# Patient Record
Sex: MALE | Race: WHITE | NOT HISPANIC OR LATINO | Employment: OTHER | URBAN - METROPOLITAN AREA
[De-identification: names, ages, dates, MRNs, and addresses within clinical notes are randomized per-mention and may not be internally consistent; named-entity substitution may affect disease eponyms.]

---

## 2017-04-02 ENCOUNTER — OFFICE VISIT (OUTPATIENT)
Dept: URGENT CARE | Facility: URGENT CARE | Age: 22
End: 2017-04-02
Payer: COMMERCIAL

## 2017-04-02 VITALS
TEMPERATURE: 98 F | SYSTOLIC BLOOD PRESSURE: 100 MMHG | OXYGEN SATURATION: 98 % | DIASTOLIC BLOOD PRESSURE: 60 MMHG | WEIGHT: 178.5 LBS | HEART RATE: 74 BPM | BODY MASS INDEX: 27.34 KG/M2

## 2017-04-02 DIAGNOSIS — Z11.3 SCREEN FOR STD (SEXUALLY TRANSMITTED DISEASE): ICD-10-CM

## 2017-04-02 DIAGNOSIS — R30.0 DYSURIA: Primary | ICD-10-CM

## 2017-04-02 DIAGNOSIS — N34.2 URETHRITIS: ICD-10-CM

## 2017-04-02 LAB
ALBUMIN UR-MCNC: NEGATIVE MG/DL
APPEARANCE UR: CLEAR
BACTERIA #/AREA URNS HPF: ABNORMAL /HPF
BILIRUB UR QL STRIP: NEGATIVE
COLOR UR AUTO: YELLOW
GLUCOSE UR STRIP-MCNC: NEGATIVE MG/DL
HGB UR QL STRIP: NEGATIVE
KETONES UR STRIP-MCNC: NEGATIVE MG/DL
LEUKOCYTE ESTERASE UR QL STRIP: NEGATIVE
NITRATE UR QL: NEGATIVE
PH UR STRIP: 6.5 PH (ref 5–7)
RBC #/AREA URNS AUTO: ABNORMAL /HPF (ref 0–2)
SP GR UR STRIP: 1.01 (ref 1–1.03)
URN SPEC COLLECT METH UR: ABNORMAL
UROBILINOGEN UR STRIP-ACNC: 0.2 EU/DL (ref 0.2–1)
WBC #/AREA URNS AUTO: ABNORMAL /HPF (ref 0–2)

## 2017-04-02 PROCEDURE — 99214 OFFICE O/P EST MOD 30 MIN: CPT | Performed by: PHYSICIAN ASSISTANT

## 2017-04-02 PROCEDURE — 87591 N.GONORRHOEAE DNA AMP PROB: CPT | Performed by: PHYSICIAN ASSISTANT

## 2017-04-02 PROCEDURE — 87491 CHLMYD TRACH DNA AMP PROBE: CPT | Performed by: PHYSICIAN ASSISTANT

## 2017-04-02 PROCEDURE — 87086 URINE CULTURE/COLONY COUNT: CPT | Performed by: PHYSICIAN ASSISTANT

## 2017-04-02 PROCEDURE — 81001 URINALYSIS AUTO W/SCOPE: CPT | Performed by: PHYSICIAN ASSISTANT

## 2017-04-02 RX ORDER — AZITHROMYCIN 500 MG/1
1000 TABLET, FILM COATED ORAL ONCE
Qty: 2 TABLET | Refills: 0 | Status: SHIPPED | OUTPATIENT
Start: 2017-04-02 | End: 2017-04-02

## 2017-04-02 NOTE — NURSING NOTE
"Chief Complaint   Patient presents with     Urgent Care     Pt c/o constant burning at the end of his penis X 4 days.       Initial /60  Pulse 74  Temp 98  F (36.7  C)  Wt 178 lb 8 oz (81 kg)  SpO2 98%  BMI 27.34 kg/m2 Estimated body mass index is 27.34 kg/(m^2) as calculated from the following:    Height as of 2/29/16: 5' 7.75\" (1.721 m).    Weight as of this encounter: 178 lb 8 oz (81 kg).  Medication Reconciliation: complete    "

## 2017-04-02 NOTE — PROGRESS NOTES
SUBJECTIVE:   Tyrone Whitfield is a 21 year old male who  presents today for burning with urination, but no urinary frequency.  No fevers.    Denies any penile drainage.  Denies any penile sores.    Denies any abdominal pain.   He is otherwise in his usual state of health.     Past Medical History:   Diagnosis Date     Attention deficit disorder with hyperactivity(314.01)      Tinea corporis 8/19/2012     Tourette's disorder      Patient Active Problem List   Diagnosis     Tourette's disorder     Attention deficit hyperactivity disorder (ADHD)     Upper back pain     Environmental allergies     Eczema     Feeling tired     Depression     Cervicalgia     Bilateral low back pain without sciatica     Social History   Substance Use Topics     Smoking status: Never Smoker     Smokeless tobacco: Never Used     Alcohol use No       ROS:   CONSTITUTIONAL:NEGATIVE for fever, chills, change in weight  INTEGUMENTARY/SKIN: NEGATIVE for worrisome rashes, moles or lesions  ENT/MOUTH: NEGATIVE for ear, mouth and throat problems  RESP:NEGATIVE for significant cough or SOB  CV: NEGATIVE for chest pain, palpitations or peripheral edema  GI: NEGATIVE for nausea, abdominal pain, heartburn, or change in bowel habits  : as per HPI    OBJECTIVE:  /60  Pulse 74  Temp 98  F (36.7  C)  Wt 178 lb 8 oz (81 kg)  SpO2 98%  BMI 27.34 kg/m2  GENERAL APPEARANCE: healthy, alert and no distress  ABDOMEN:  soft, nontender, no HSM or masses and bowel sounds normal  BACK: No CVA tenderness  GU_male: testicles normal without  masses or  tenderness, no hernias and penis normal without urethral discharge  SKIN: no suspicious lesions or rashes    (R30.0) Dysuria  (primary encounter diagnosis)  Comment:   Plan: Urine Culture Aerobic Bacterial            (Z11.3) Screen for STD (sexually transmitted disease)  Comment:   Plan: UA with Microscopic reflex to Culture,         Chlamydia trachomatis PCR, Neisseria         gonorrhoeae PCR             (N34.2) Urethritis  Comment:   Plan: azithromycin (ZITHROMAX) 500 MG tablet            F/U with PCP should symptoms persist or worsen.    Patient expresses understanding and agreement with the assessment and plan as above.

## 2017-04-02 NOTE — MR AVS SNAPSHOT
"              After Visit Summary   2017    Tyrone Whitfield    MRN: [id number]           Patient Information     Date Of Birth          1995        Visit Information        Provider Department      2017 4:45 PM Ariane Casper PA-C [facility]        Today's Diagnoses     Dysuria    -  1    Screen for STD (sexually transmitted disease)        Urethritis           Follow-ups after your visit        Who to contact     If you have questions or need follow up information about today's clinic visit or your schedule please contact [facility] directly at [phone].  Normal or non-critical lab and imaging results will be communicated to you by Recurvehart, letter or phone within 4 business days after the clinic has received the results. If you do not hear from us within 7 days, please contact the clinic through Recurvehart or phone. If you have a critical or abnormal lab result, we will notify you by phone as soon as possible.  Submit refill requests through Trip4real or call your pharmacy and they will forward the refill request to us. Please allow 3 business days for your refill to be completed.          Additional Information About Your Visit        MyChart Information     Trip4real lets you send messages to your doctor, view your test results, renew your prescriptions, schedule appointments and more. To sign up, go to [email or web address] . Click on \"Log in\" on the left side of the screen, which will take you to the Welcome page. Then click on \"Sign up Now\" on the right side of the page.     You will be asked to enter the access code listed below, as well as some personal information. Please follow the directions to create your username and password.     Your access code is: [email or web address]  Expires: 2017  6:26 PM     Your access code will  in 90 days. If you need help or a new code, please call your [place] clinic or [phone].      "   Care EveryWhere ID     This is your Care EveryWhere ID. This could be used by other organizations to access your [place] medical records  [id number]        Your Vitals Were     Pulse Temperature Pulse Oximetry BMI (Body Mass Index)          74 98  F (36.7  C) 98% 27.34 kg/m2         Blood Pressure from Last 3 Encounters:   04/02/17 100/60   05/03/16 113/68   02/29/16 98/58    Weight from Last 3 Encounters:   04/02/17 178 lb 8 oz (81 kg)   02/29/16 173 lb 9.6 oz (78.7 kg)   08/20/15 166 lb (75.3 kg) (66 %)*     * Growth percentiles are based on [facility] 2-20 Years data.              We Performed the Following     Chlamydia trachomatis PCR     Neisseria gonorrhoeae PCR     UA with Microscopic reflex to Culture     Urine Culture Aerobic Bacterial          Today's Medication Changes          These changes are accurate as of: 4/2/17  6:26 PM.  If you have any questions, ask your nurse or doctor.               Start taking these medicines.        Dose/Directions    azithromycin 500 MG tablet   Commonly known as:  ZITHROMAX   Used for:  Urethritis   Started by:  Ariane Casper PA-C        Dose:  1000 mg   Take 2 tablets (1,000 mg) by mouth once for 1 dose   Quantity:  2 tablet   Refills:  0            Where to get your medicines      Some of these will need a paper prescription and others can be bought over the counter.  Ask your nurse if you have questions.     Bring a paper prescription for each of these medications     azithromycin 500 MG tablet                Primary Care Provider    None Specified       No primary provider on file.        Thank you!     Thank you for choosing [facility]  for your care. Our goal is always to provide you with excellent care. Hearing back from our patients is one way we can continue to improve our services. Please take a few minutes to complete the written survey that you may receive in the mail after your visit with us. Thank you!              Your Updated Medication List - Protect others around you: Learn how to safely use, store and throw away your medicines at www.disposemymeds.org.          This list is accurate as of: 4/2/17  6:26 PM.  Always use your most recent med list.                   Brand Name Dispense Instructions for use    azithromycin 500 MG tablet    ZITHROMAX    2 tablet    Take 2 tablets (1,000 mg) by mouth once for 1 dose       ibuprofen 600 MG tablet    ADVIL/MOTRIN    30 tablet    Take 1 tablet (600 mg) by mouth every 6 hours as needed for moderate pain       MULTIVITAMIN & MINERAL PO      Take 1 tablet by mouth daily       SERTRALINE HCL PO      Take by mouth daily

## 2017-04-04 LAB
BACTERIA SPEC CULT: NO GROWTH
C TRACH DNA SPEC QL NAA+PROBE: NORMAL
MICRO REPORT STATUS: NORMAL
N GONORRHOEA DNA SPEC QL NAA+PROBE: NORMAL
SPECIMEN SOURCE: NORMAL

## 2017-05-18 ENCOUNTER — OFFICE VISIT (OUTPATIENT)
Dept: URGENT CARE | Facility: URGENT CARE | Age: 22
End: 2017-05-18
Payer: COMMERCIAL

## 2017-05-18 VITALS
BODY MASS INDEX: 26.19 KG/M2 | OXYGEN SATURATION: 96 % | DIASTOLIC BLOOD PRESSURE: 60 MMHG | TEMPERATURE: 97.8 F | WEIGHT: 171 LBS | SYSTOLIC BLOOD PRESSURE: 108 MMHG | HEART RATE: 64 BPM

## 2017-05-18 DIAGNOSIS — R30.0 DYSURIA: Primary | ICD-10-CM

## 2017-05-18 LAB
ALBUMIN UR-MCNC: NEGATIVE MG/DL
APPEARANCE UR: CLEAR
BILIRUB UR QL STRIP: NEGATIVE
COLOR UR AUTO: YELLOW
GLUCOSE UR STRIP-MCNC: NEGATIVE MG/DL
HGB UR QL STRIP: NEGATIVE
KETONES UR STRIP-MCNC: NEGATIVE MG/DL
LEUKOCYTE ESTERASE UR QL STRIP: NEGATIVE
NITRATE UR QL: NEGATIVE
PH UR STRIP: 5.5 PH (ref 5–7)
RBC #/AREA URNS AUTO: NORMAL /HPF (ref 0–2)
SP GR UR STRIP: 1.02 (ref 1–1.03)
URN SPEC COLLECT METH UR: NORMAL
UROBILINOGEN UR STRIP-ACNC: 0.2 EU/DL (ref 0.2–1)
WBC #/AREA URNS AUTO: NORMAL /HPF (ref 0–2)

## 2017-05-18 PROCEDURE — 87591 N.GONORRHOEAE DNA AMP PROB: CPT | Performed by: PHYSICIAN ASSISTANT

## 2017-05-18 PROCEDURE — 81001 URINALYSIS AUTO W/SCOPE: CPT | Performed by: PHYSICIAN ASSISTANT

## 2017-05-18 PROCEDURE — 99213 OFFICE O/P EST LOW 20 MIN: CPT | Performed by: PHYSICIAN ASSISTANT

## 2017-05-18 PROCEDURE — 87086 URINE CULTURE/COLONY COUNT: CPT | Performed by: PHYSICIAN ASSISTANT

## 2017-05-18 PROCEDURE — 87491 CHLMYD TRACH DNA AMP PROBE: CPT | Performed by: PHYSICIAN ASSISTANT

## 2017-05-18 NOTE — MR AVS SNAPSHOT
"              After Visit Summary   2017    Tyrone Whitfield    MRN: [id number]           Patient Information     Date Of Birth          1995        Visit Information        Provider Department      2017 6:10 PM Ariane Casper PA-C [facility]        Today's Diagnoses     Dysuria    -  1       Follow-ups after your visit        Who to contact     If you have questions or need follow up information about today's clinic visit or your schedule please contact [facility] directly at [phone].  Normal or non-critical lab and imaging results will be communicated to you by Jambotechhart, letter or phone within 4 business days after the clinic has received the results. If you do not hear from us within 7 days, please contact the clinic through Jambotechhart or phone. If you have a critical or abnormal lab result, we will notify you by phone as soon as possible.  Submit refill requests through Piictu or call your pharmacy and they will forward the refill request to us. Please allow 3 business days for your refill to be completed.          Additional Information About Your Visit        MyChart Information     Piictu lets you send messages to your doctor, view your test results, renew your prescriptions, schedule appointments and more. To sign up, go to [email or web address] . Click on \"Log in\" on the left side of the screen, which will take you to the Welcome page. Then click on \"Sign up Now\" on the right side of the page.     You will be asked to enter the access code listed below, as well as some personal information. Please follow the directions to create your username and password.     Your access code is: [email or web address]  Expires: 2017  6:26 PM     Your access code will  in 90 days. If you need help or a new code, please call your [place] clinic or [phone].        Care EveryWhere ID     This is your Care EveryWhere ID. This could " be used by other organizations to access your [place] medical records  [id number]        Your Vitals Were     Pulse Temperature Pulse Oximetry BMI (Body Mass Index)          64 97.8  F (36.6  C) (Oral) 96% 26.19 kg/m2         Blood Pressure from Last 3 Encounters:   05/18/17 108/60   04/02/17 100/60   05/03/16 113/68    Weight from Last 3 Encounters:   05/18/17 171 lb (77.6 kg)   04/02/17 178 lb 8 oz (81 kg)   02/29/16 173 lb 9.6 oz (78.7 kg)              We Performed the Following     Chlamydia trachomatis PCR     Neisseria gonorrhoeae PCR     UA with Microscopic reflex to Culture     Urine Culture Aerobic Bacterial        Primary Care Provider    None Specified       No primary provider on file.        Thank you!     Thank you for choosing [facility]  for your care. Our goal is always to provide you with excellent care. Hearing back from our patients is one way we can continue to improve our services. Please take a few minutes to complete the written survey that you may receive in the mail after your visit with us. Thank you!             Your Updated Medication List - Protect others around you: Learn how to safely use, store and throw away your medicines at www.disposemymeds.org.          This list is accurate as of: 5/18/17  8:27 PM.  Always use your most recent med list.                   Brand Name Dispense Instructions for use    ibuprofen 600 MG tablet    ADVIL/MOTRIN    30 tablet    Take 1 tablet (600 mg) by mouth every 6 hours as needed for moderate pain       MULTIVITAMIN & MINERAL PO      Take 1 tablet by mouth daily       SERTRALINE HCL PO      Take by mouth daily

## 2017-05-18 NOTE — NURSING NOTE
"Chief Complaint   Patient presents with     Urinary Problem     burning sensation on and off x 3 weeks.        Initial /60 (BP Location: Left arm, Patient Position: Chair, Cuff Size: Adult Regular)  Pulse 64  Temp 97.8  F (36.6  C) (Oral)  Wt 171 lb (77.6 kg)  SpO2 96%  BMI 26.19 kg/m2 Estimated body mass index is 26.19 kg/(m^2) as calculated from the following:    Height as of 2/29/16: 5' 7.75\" (1.721 m).    Weight as of this encounter: 171 lb (77.6 kg).  Medication Reconciliation: complete    "

## 2017-05-19 NOTE — PROGRESS NOTES
SUBJECTIVE:   Tyrone Whitfield is a 21 year old male who  presents today for some residual intermittent dysuria.  Denies any penile discharge.  Denies any penile sores.    Had negative STD testing 4/2/17.  Was treated for urethritis, has significant improvement in his symptoms, but not 100%.  Denies any other symptoms.   He is otherwise in his usual state of good health.        Past Medical History:   Diagnosis Date     Attention deficit disorder with hyperactivity(314.01)      Tinea corporis 8/19/2012     Tourette's disorder      Patient Active Problem List   Diagnosis     Tourette's disorder     Attention deficit hyperactivity disorder (ADHD)     Upper back pain     Environmental allergies     Eczema     Feeling tired     Depression     Cervicalgia     Bilateral low back pain without sciatica     Social History   Substance Use Topics     Smoking status: Never Smoker     Smokeless tobacco: Never Used     Alcohol use No       ROS:   CONSTITUTIONAL:NEGATIVE for fever, chills, change in weight  INTEGUMENTARY/SKIN: NEGATIVE for worrisome rashes, moles or lesions  GI: NEGATIVE for nausea, abdominal pain, heartburn, or change in bowel habits  : as per HPI    OBJECTIVE:  /60 (BP Location: Left arm, Patient Position: Chair, Cuff Size: Adult Regular)  Pulse 64  Temp 97.8  F (36.6  C) (Oral)  Wt 171 lb (77.6 kg)  SpO2 96%  BMI 26.19 kg/m2  GENERAL APPEARANCE: healthy, alert and no distress  ABDOMEN:  soft, nontender, no HSM or masses and bowel sounds normal  BACK: No CVA tenderness  GU_male: deferred  SKIN: no suspicious lesions or rashes    (R30.0) Dysuria  (primary encounter diagnosis)  Comment:   Plan: UA with Microscopic reflex to Culture,         Neisseria gonorrhoeae PCR, Chlamydia         trachomatis PCR, Urine Culture Aerobic         Bacterial          Establish care and follow up with PCP.    Maintain good water intake daily    Patient expresses understanding and agreement with the assessment and plan as  above.

## 2017-05-20 LAB
BACTERIA SPEC CULT: NO GROWTH
MICRO REPORT STATUS: NORMAL
SPECIMEN SOURCE: NORMAL

## 2017-05-21 LAB
C TRACH DNA SPEC QL NAA+PROBE: NORMAL
N GONORRHOEA DNA SPEC QL NAA+PROBE: NORMAL
SPECIMEN SOURCE: NORMAL
SPECIMEN SOURCE: NORMAL

## 2018-09-14 ENCOUNTER — TELEPHONE (OUTPATIENT)
Dept: OTHER | Facility: CLINIC | Age: 23
End: 2018-09-14

## 2019-06-12 ENCOUNTER — NURSE TRIAGE (OUTPATIENT)
Dept: NURSING | Facility: CLINIC | Age: 24
End: 2019-06-12

## 2019-06-12 ENCOUNTER — OFFICE VISIT (OUTPATIENT)
Dept: URGENT CARE | Facility: URGENT CARE | Age: 24
End: 2019-06-12
Payer: COMMERCIAL

## 2019-06-12 VITALS
DIASTOLIC BLOOD PRESSURE: 78 MMHG | WEIGHT: 174 LBS | RESPIRATION RATE: 16 BRPM | BODY MASS INDEX: 26.65 KG/M2 | HEART RATE: 84 BPM | OXYGEN SATURATION: 98 % | SYSTOLIC BLOOD PRESSURE: 115 MMHG

## 2019-06-12 DIAGNOSIS — R59.1 LYMPHADENOPATHY: Primary | ICD-10-CM

## 2019-06-12 PROCEDURE — 99213 OFFICE O/P EST LOW 20 MIN: CPT | Performed by: FAMILY MEDICINE

## 2019-06-12 RX ORDER — CEPHALEXIN 500 MG/1
500 CAPSULE ORAL 3 TIMES DAILY
Qty: 30 CAPSULE | Refills: 0 | Status: SHIPPED | OUTPATIENT
Start: 2019-06-12 | End: 2019-06-22

## 2019-06-12 RX ORDER — NAPROXEN 500 MG/1
500 TABLET ORAL 2 TIMES DAILY WITH MEALS
Qty: 14 TABLET | Refills: 0 | Status: SHIPPED | OUTPATIENT
Start: 2019-06-12 | End: 2019-06-19

## 2019-06-12 NOTE — TELEPHONE ENCOUNTER
"Pt calls in with complaint of \" cyst\" under right arm   No fever  No real pain - alittle tender when arm rubs against body     Pt's MAIN concern/question is where he should go and what type of provider should he see  Pt also advised to think about getting a primary provider     In mean time - agreed Urgent Care would be able to see him for his present complaint     Protocol and care advice reviewed  Caller states understanding of the recommended disposition    Advised to call back if further questions or concerns    Dagoberto Oropeza RN / [facility]    Reason for Disposition    Boil > 1/2 inch across (> 12 mm; larger than a marble)    Additional Information    Negative: [1] Widespread rash AND [2] bright red, sunburn-like AND [3] too weak to stand    Negative: Sounds like a life-threatening emergency to the triager    Negative: Widespread red rash    Negative: Black (necrotic) color or blisters develop in wound    Negative: Patient sounds very sick or weak to the triager    Negative: SEVERE pain (e.g., excruciating)    Negative: Red streak from area of infection    Negative: Fever > 100.5 F (38.1 C)    Negative: Boil > 2 inches across (> 5 cm; larger than a golf ball or ping pong ball)    Negative: [1] Boil > 1/2 inch across (> 12 mm; larger than a marble) AND [2] center is soft or pus colored    Negative: [1] Boil > 1/4 inch across (> 6 mm; larger than a pencil eraser) AND [2] on face    Negative: [1] Boil AND [2] diabetes mellitus or weak immune system (e.g., HIV positive, cancer chemotherapy, transplant patient)    Negative: 2 or more boils    Negative: [1] Spreading redness around the boil AND [2] no fever    Protocols used: BOIL (SKIN ABSCESS)-A-AH      "

## 2019-06-12 NOTE — PROGRESS NOTES
SUBJECTIVE: Tyrone Whitfield is a 23 year old male presenting with a chief complaint of tender lymphnode rt axilla.  Onset of symptoms was 2 week(s) ago.  Course of illness is same.    Severity moderate  Current and Associated symptoms: none  Treatment measures tried include None tried.  Predisposing factors include None.    Past Medical History:   Diagnosis Date     Attention deficit disorder with hyperactivity(314.01)      Tinea corporis 8/19/2012     Tourette's disorder      Allergies   Allergen Reactions     Dog Hair [Dogs]      Nkda [No Known Drug Allergies]      Pollen Extract      Seasonal Allergies      Social History     Tobacco Use     Smoking status: Never Smoker     Smokeless tobacco: Never Used   Substance Use Topics     Alcohol use: No     Alcohol/week: 0.0 oz       ROS:  SKIN: no rash  GI: no vomiting    OBJECTIVE:  /78 (BP Location: Right arm, Patient Position: Sitting, Cuff Size: Adult Regular)   Pulse 84   Resp 16   Wt 78.9 kg (174 lb)   SpO2 98%   BMI 26.65 kg/m  GENERAL APPEARANCE: healthy, alert and no distress  SKIN: small tender lump rt axilla      ICD-10-CM    1. Lymphadenopathy R59.1 cephALEXin (KEFLEX) 500 MG capsule     naproxen (NAPROSYN) 500 MG tablet       Fluids/Rest, f/u PCP 1 week if not improved/resolved

## 2020-03-15 ENCOUNTER — OFFICE VISIT (OUTPATIENT)
Dept: URGENT CARE | Facility: URGENT CARE | Age: 25
End: 2020-03-15
Payer: COMMERCIAL

## 2020-03-15 VITALS
DIASTOLIC BLOOD PRESSURE: 63 MMHG | HEART RATE: 73 BPM | SYSTOLIC BLOOD PRESSURE: 98 MMHG | RESPIRATION RATE: 13 BRPM | OXYGEN SATURATION: 98 % | TEMPERATURE: 98 F

## 2020-03-15 DIAGNOSIS — R53.83 FATIGUE, UNSPECIFIED TYPE: Primary | ICD-10-CM

## 2020-03-15 LAB
ANION GAP SERPL CALCULATED.3IONS-SCNC: 3 MMOL/L (ref 3–14)
BASOPHILS # BLD AUTO: 0 10E9/L (ref 0–0.2)
BASOPHILS NFR BLD AUTO: 0.3 %
BUN SERPL-MCNC: 21 MG/DL (ref 7–30)
CALCIUM SERPL-MCNC: 9.3 MG/DL (ref 8.5–10.1)
CHLORIDE SERPL-SCNC: 105 MMOL/L (ref 94–109)
CO2 SERPL-SCNC: 29 MMOL/L (ref 20–32)
CREAT SERPL-MCNC: 1.18 MG/DL (ref 0.66–1.25)
DIFFERENTIAL METHOD BLD: ABNORMAL
EOSINOPHIL # BLD AUTO: 1.2 10E9/L (ref 0–0.7)
EOSINOPHIL NFR BLD AUTO: 18.7 %
ERYTHROCYTE [DISTWIDTH] IN BLOOD BY AUTOMATED COUNT: 12.2 % (ref 10–15)
FLUAV+FLUBV AG SPEC QL: NEGATIVE
FLUAV+FLUBV AG SPEC QL: NEGATIVE
GFR SERPL CREATININE-BSD FRML MDRD: 86 ML/MIN/{1.73_M2}
GLUCOSE SERPL-MCNC: 96 MG/DL (ref 70–99)
HCT VFR BLD AUTO: 47.8 % (ref 40–53)
HGB BLD-MCNC: 16.3 G/DL (ref 13.3–17.7)
LYMPHOCYTES # BLD AUTO: 2 10E9/L (ref 0.8–5.3)
LYMPHOCYTES NFR BLD AUTO: 31.2 %
MCH RBC QN AUTO: 31.2 PG (ref 26.5–33)
MCHC RBC AUTO-ENTMCNC: 34.1 G/DL (ref 31.5–36.5)
MCV RBC AUTO: 91 FL (ref 78–100)
MONOCYTES # BLD AUTO: 0.4 10E9/L (ref 0–1.3)
MONOCYTES NFR BLD AUTO: 6.8 %
NEUTROPHILS # BLD AUTO: 2.7 10E9/L (ref 1.6–8.3)
NEUTROPHILS NFR BLD AUTO: 43 %
PLATELET # BLD AUTO: 197 10E9/L (ref 150–450)
POTASSIUM SERPL-SCNC: 4.2 MMOL/L (ref 3.4–5.3)
RBC # BLD AUTO: 5.23 10E12/L (ref 4.4–5.9)
SODIUM SERPL-SCNC: 137 MMOL/L (ref 133–144)
SPECIMEN SOURCE: NORMAL
WBC # BLD AUTO: 6.3 10E9/L (ref 4–11)

## 2020-03-15 PROCEDURE — 80048 BASIC METABOLIC PNL TOTAL CA: CPT | Performed by: FAMILY MEDICINE

## 2020-03-15 PROCEDURE — 36415 COLL VENOUS BLD VENIPUNCTURE: CPT | Performed by: FAMILY MEDICINE

## 2020-03-15 PROCEDURE — 87651 STREP A DNA AMP PROBE: CPT | Performed by: FAMILY MEDICINE

## 2020-03-15 PROCEDURE — 40001204 ZZHCL STATISTIC STREP A RAPID: Performed by: FAMILY MEDICINE

## 2020-03-15 PROCEDURE — 99213 OFFICE O/P EST LOW 20 MIN: CPT | Performed by: FAMILY MEDICINE

## 2020-03-15 PROCEDURE — 87804 INFLUENZA ASSAY W/OPTIC: CPT | Performed by: FAMILY MEDICINE

## 2020-03-15 PROCEDURE — 85025 COMPLETE CBC W/AUTO DIFF WBC: CPT | Performed by: FAMILY MEDICINE

## 2020-03-15 RX ORDER — BUPROPION HYDROCHLORIDE 100 MG/1
100 TABLET, EXTENDED RELEASE ORAL 2 TIMES DAILY
COMMUNITY
Start: 2019-11-17 | End: 2021-01-07

## 2020-03-15 NOTE — PROGRESS NOTES
Subjective     Tyrone Whitfield is a 24 year old male who presents to clinic today for the following health issues:    HPI   Chief Complaint   Patient presents with     Headache     headache for three days.       Duration: 3 days     Description (location/character/radiation): headache, dizziness, fatigue, mild sob    Intensity:  moderate    Accompanying signs and symptoms: no fever, chills, sore throat, chest pain, cough or sneezing     History (similar episodes/previous evaluation): None    Precipitating or alleviating factors: None    Therapies tried and outcome: ibuprofen     Not travelled out of country although was in [place] about 1 week, no known sick contact     by profession       Patient Active Problem List   Diagnosis     Tourette's disorder     Attention deficit hyperactivity disorder (ADHD)     Upper back pain     Environmental allergies     Eczema     Feeling tired     Depression     Cervicalgia     Bilateral low back pain without sciatica     Past Surgical History:   Procedure Laterality Date     TONSILLECTOMY         Social History     Tobacco Use     Smoking status: Never Smoker     Smokeless tobacco: Never Used   Substance Use Topics     Alcohol use: No     Alcohol/week: 0.0 standard drinks     Family History   Adopted: Yes   Problem Relation Age of Onset     Cancer Maternal Grandmother         Lung  age 62     Cancer Paternal Grandfather         leukemia - age 73     Respiratory Brother         asthma     Neurologic Disorder Father         cholesteotoma, brain base     Parkinsonism Paternal Grandmother      Arthritis Paternal Grandmother         deforming arthritis     Arthritis Paternal Uncle         ?type (on ?anti-TNF)     Psoriasis Mother      Family History Negative Unknown         neg for IBD         Current Outpatient Medications   Medication Sig Dispense Refill     buPROPion (WELLBUTRIN SR) 100 MG 12 hr tablet Take 100 mg by mouth 2 times daily        ibuprofen  (ADVIL,MOTRIN) 600 MG tablet Take 1 tablet (600 mg) by mouth every 6 hours as needed for moderate pain 30 tablet 1     Multiple Vitamins-Minerals (MULTIVITAMIN & MINERAL PO) Take 1 tablet by mouth daily       Allergies   Allergen Reactions     Dog Hair [Dogs]      Nkda [No Known Drug Allergies]      Pollen Extract      Seasonal Allergies      Recent Labs   Lab Test 03/15/20  1329 06/15/15  1521 05/13/15  1610 06/11/13  1142   ALT  --  24 37 31   CR 1.18 1.10* 1.31*  --    GFRESTIMATED 86 86 70  --    GFRESTBLACK >90 >90   GFR Calc   85  --    POTASSIUM 4.2 3.5 4.0  --    TSH  --   --  0.77 0.97      BP Readings from Last 3 Encounters:   03/15/20 98/63   06/12/19 115/78   05/18/17 108/60    Wt Readings from Last 3 Encounters:   06/12/19 78.9 kg (174 lb)   05/18/17 77.6 kg (171 lb)   04/02/17 81 kg (178 lb 8 oz)               Reviewed and updated as needed this visit by Provider         Review of Systems   ROS COMP: Constitutional, HEENT, cardiovascular, pulmonary, gi and gu systems are negative, except as otherwise noted.      Objective    BP 98/63   Pulse 73   Temp 98  F (36.7  C) (Tympanic)   Resp 13   SpO2 98%   There is no height or weight on file to calculate BMI.  Physical Exam   GENERAL: alert and no distress  EYES: Eyes grossly normal to inspection  HENT: normal cephalic/atraumatic, ear canals and TM's normal, nose and mouth without ulcers or lesions, oropharynx clear and oral mucous membranes moist  NECK: no adenopathy, no asymmetry, masses, or scars and thyroid normal to palpation  RESP: lungs clear to auscultation - no rales, rhonchi or wheezes  CV: regular rates and rhythm, normal S1 S2, no S3 or S4, no murmur, click or rub, peripheral pulses strong and no peripheral edema  ABDOMEN: soft, nontender, without hepatosplenomegaly or masses and bowel sounds normal  MS: no gross musculoskeletal defects noted, no edema  SKIN: no suspicious lesions or rashes  NEURO: Normal strength and tone,  mentation intact and speech normal, CNII-XII intact, reflexes 2+ bilaterally, GCS 15/15, no neck rigidity, normal gait, no past pointing       Results for orders placed or performed in visit on 03/15/20   CBC with platelets and differential     Status: Abnormal   Result Value Ref Range    WBC 6.3 4.0 - 11.0 10e9/L    RBC Count 5.23 4.4 - 5.9 10e12/L    Hemoglobin 16.3 13.3 - 17.7 g/dL    Hematocrit 47.8 40.0 - 53.0 %    MCV 91 78 - 100 fl    MCH 31.2 26.5 - 33.0 pg    MCHC 34.1 31.5 - 36.5 g/dL    RDW 12.2 10.0 - 15.0 %    Platelet Count 197 150 - 450 10e9/L    % Neutrophils 43.0 %    % Lymphocytes 31.2 %    % Monocytes 6.8 %    % Eosinophils 18.7 %    % Basophils 0.3 %    Absolute Neutrophil 2.7 1.6 - 8.3 10e9/L    Absolute Lymphocytes 2.0 0.8 - 5.3 10e9/L    Absolute Monocytes 0.4 0.0 - 1.3 10e9/L    Absolute Eosinophils 1.2 (H) 0.0 - 0.7 10e9/L    Absolute Basophils 0.0 0.0 - 0.2 10e9/L    Diff Method Automated Method    Basic metabolic panel  (Ca, Cl, CO2, Creat, Gluc, K, Na, BUN)     Status: None   Result Value Ref Range    Sodium 137 133 - 144 mmol/L    Potassium 4.2 3.4 - 5.3 mmol/L    Chloride 105 94 - 109 mmol/L    Carbon Dioxide 29 20 - 32 mmol/L    Anion Gap 3 3 - 14 mmol/L    Glucose 96 70 - 99 mg/dL    Urea Nitrogen 21 7 - 30 mg/dL    Creatinine 1.18 0.66 - 1.25 mg/dL    GFR Estimate 86 >60 mL/min/[1.73_m2]    GFR Estimate If Black >90 >60 mL/min/[1.73_m2]    Calcium 9.3 8.5 - 10.1 mg/dL   Influenza A/B antigen     Status: None   Result Value Ref Range    Influenza A/B Agn Specimen Nasal     Influenza A Negative NEG^Negative    Influenza B Negative NEG^Negative   Streptococcus A Rapid Scr w Reflx to PCR     Status: None    Specimen: Throat   Result Value Ref Range    Strep Specimen Description Throat     Streptococcus Group A Rapid Screen Negative NEG^Negative         Assessment & Plan     (R53.83) Fatigue, unspecified type  (primary encounter diagnosis)  Comment: Differentials discussed in detail  including viral illness.  Lab results came back unremarkable, will do covid 19 testing as well.  Suggested to continue well hydration, over-the-counter analgesia and rest.  Instructed to go ER if symptoms worsen.  Patient understood and in agreement with above plan.  All questions were.  Plan: Influenza A/B antigen, CBC with platelets and         differential, COVID-19 Virus (Coronavirus), PCR        - Nasopharyngeal (NP) Swab in UTM, Basic         metabolic panel  (Ca, Cl, CO2, Creat, Gluc, K,         Na, BUN), Streptococcus A Rapid Scr w Reflx to         PCR, Group A Streptococcus PCR Throat Swab,         CANCELED: Group A Streptococcus PCR Throat Swab              Blake Rodriguez MD  [facility]

## 2020-03-15 NOTE — PATIENT INSTRUCTIONS
You are being tested for Corona Virus 19, Influenza and possibly RSV.    Please use the information at the end of this document to sign up for [facility] [employer] where you can get your results and a message about those results sent to you through the [employer] application. If you do not have mychart we will call you with your results but it may take longer.    Isolate Yourself:    Isolate yourself while traveling.    Do Not allow any visitors within 6 feet.    Do Not go to work or school.    Do Not go to [employer],  centers, shopping, or other public places.    Do Not shake hands.    Avoid close contact with others (hugging, kissing).    Protect Others:    Cover Your Mouth and Nose with a mask, disposable tissue or wash cloth to avoid spreading germs to others.    Wash your hands and face frequently with soap and water    Call Back If: Breathing difficulty develops or you become worse.    For more information about COVID19 and options for caring for yourself at home, please visit the CDC website at https://www.cdc.gov/coronavirus/2019-ncov/about/steps-when-sick.html  For more options for care at [facility], please visit our website at [email or web address]

## 2020-03-16 ENCOUNTER — DOCUMENTATION ONLY (OUTPATIENT)
Dept: FAMILY MEDICINE | Facility: CLINIC | Age: 25
End: 2020-03-16

## 2020-03-16 LAB
DEPRECATED S PYO AG THROAT QL EIA: NEGATIVE
SPECIMEN SOURCE: NORMAL
SPECIMEN SOURCE: NORMAL
STREP GROUP A PCR: NOT DETECTED

## 2020-03-20 LAB — COVID-19 VIRUS PCR RESULT FROM MDH: NEGATIVE

## 2020-03-22 ENCOUNTER — NURSE TRIAGE (OUTPATIENT)
Dept: NURSING | Facility: CLINIC | Age: 25
End: 2020-03-22

## 2020-03-22 ENCOUNTER — TELEPHONE (OUTPATIENT)
Dept: EMERGENCY MEDICINE | Facility: CLINIC | Age: 25
End: 2020-03-22

## 2020-03-22 NOTE — TELEPHONE ENCOUNTER
Calling for results of 3/15 labs including coronavirus test. See 3/22 TE for call details.     Reason for Disposition    Caller requesting lab results    Additional Information    Negative: Lab calling with strep throat test results and triager can call in prescription    Negative: Lab calling with urinalysis test results and triager can call in prescription    Negative: Medication questions    Negative: ED call to PCP    Negative: Physician call to PCP    Negative: Call about patient who is currently hospitalized    Negative: Lab or radiology calling with CRITICAL test results    Negative: [1] Prescription not at pharmacy AND [2] was prescribed today by PCP    Negative: [1] Follow-up call from patient regarding patient's clinical status AND [2] information urgent    Negative: [1] Caller requests to speak ONLY to PCP AND [2] URGENT question    Negative: [1] Caller requests to speak to PCP now AND [2] won't tell us reason for call  (Exception: if 10 pm to 6 am, caller must first discuss reason for the call)    Negative: Notification of hospital admission    Negative: Notification of death    Protocols used: PCP CALL - NO TRIAGE-A-

## 2020-03-22 NOTE — TELEPHONE ENCOUNTER
Pt returned call. Informed of negative Covid-19 test result. Also informed of negative Influenza A & B results and other normal labs from 3/15/20. Pt voiced understanding and no further questions. Loly Fountain RN/FNA

## 2020-03-22 NOTE — TELEPHONE ENCOUNTER
Coronavirus (COVID-19) Notification    Reason for call  Notify of Negative COVID-19 lab result, assess symptoms,  review [facility] recommendations    Lab Result   Lab test 2019-nCoV rRt-PCR  Oropharyngeal AND/OR nasopharyngeal swabs were NEGATIVE for 2019-nCoV RNA    Unable to reach Patient by phone at this time  Left voicemail message requesting a call back between 10A to 6:30P to [facility] Result phone line at [phone].         [RN/LPN Name]  Tammie Severson, LPN

## 2020-07-27 ENCOUNTER — OFFICE VISIT (OUTPATIENT)
Dept: URGENT CARE | Facility: URGENT CARE | Age: 25
End: 2020-07-27
Payer: COMMERCIAL

## 2020-07-27 VITALS
DIASTOLIC BLOOD PRESSURE: 68 MMHG | TEMPERATURE: 97.7 F | HEART RATE: 68 BPM | OXYGEN SATURATION: 98 % | SYSTOLIC BLOOD PRESSURE: 118 MMHG

## 2020-07-27 DIAGNOSIS — R07.0 THROAT PAIN: Primary | ICD-10-CM

## 2020-07-27 LAB
DEPRECATED S PYO AG THROAT QL EIA: NEGATIVE
SPECIMEN SOURCE: NORMAL

## 2020-07-27 PROCEDURE — 87651 STREP A DNA AMP PROBE: CPT | Performed by: PHYSICIAN ASSISTANT

## 2020-07-27 PROCEDURE — 99213 OFFICE O/P EST LOW 20 MIN: CPT | Performed by: PHYSICIAN ASSISTANT

## 2020-07-27 RX ORDER — LIDOCAINE HYDROCHLORIDE 20 MG/ML
SOLUTION OROPHARYNGEAL
Qty: 100 ML | Refills: 0 | Status: SHIPPED | OUTPATIENT
Start: 2020-07-27 | End: 2021-01-07

## 2020-07-27 RX ORDER — BUPROPION HYDROCHLORIDE 150 MG/1
TABLET, EXTENDED RELEASE ORAL
COMMUNITY
Start: 2020-05-15

## 2020-07-27 NOTE — PROGRESS NOTES
SUBJECTIVE:  Tyrone Whitfield is a 24 year old male with a chief complaint of sore throat.  Had chest congestion 1 week ago that improved some but still feels congested.  Feels SOB with activity  Feels like needs to cough but cant.  Onset of symptoms was 4 day(s) ago.    Course of illness: gradual onset and still present.  Severity mild   Throat is bothering him the most and worried about strep  Current and Associated symptoms: negative other than stated above  Treatment measures tried include Tylenol/Ibuprofen, OTC Cough med, Fluids and Rest.  Predisposing factors include None.    Negative COVID test yesterday.      Past Medical History:   Diagnosis Date     Attention deficit disorder with hyperactivity(314.01)      Tinea corporis 8/19/2012     Tourette's disorder      Current Outpatient Medications   Medication Sig Dispense Refill     buPROPion (WELLBUTRIN SR) 100 MG 12 hr tablet Take 100 mg by mouth 2 times daily        buPROPion (WELLBUTRIN SR) 150 MG 12 hr tablet TK 1 T PO QAM       ibuprofen (ADVIL,MOTRIN) 600 MG tablet Take 1 tablet (600 mg) by mouth every 6 hours as needed for moderate pain 30 tablet 1     Multiple Vitamins-Minerals (MULTIVITAMIN & MINERAL PO) Take 1 tablet by mouth daily       Social History     Tobacco Use     Smoking status: Never Smoker     Smokeless tobacco: Never Used   Substance Use Topics     Alcohol use: No     Alcohol/week: 0.0 standard drinks       ROS:  Review of systems negative except as stated above.    OBJECTIVE:   /68   Pulse 68   Temp 97.7  F (36.5  C) (Tympanic)   SpO2 98%   GENERAL APPEARANCE: healthy, alert and no distress  EYES: EOMI,  PERRL, conjunctiva clear  HENT: ear canals and TM's normal.  Nose normal.  Pharynx erythematous without exudate noted.  Uvula midline and no abscess noted  NECK: supple, non-tender to palpation, no adenopathy noted  RESP: lungs clear to auscultation - no rales, rhonchi or wheezes  CV: regular rates and rhythm, normal S1 S2, no  murmur noted  ABDOMEN:  soft, nontender, no HSM or masses and bowel sounds normal  SKIN: no suspicious lesions or rashes    Rapid Strep test is negative; await throat culture results.    ASSESSMENT:   Throat pain    PLAN:   Negative strep and negative COVID.  Appears well.  Culture pending.    Symptomatic treat with gargles, lozenges, and OTC analgesic as needed. Follow-up with primary clinic if not improving.  Lidocaine as needed

## 2020-07-28 LAB
SPECIMEN SOURCE: NORMAL
STREP GROUP A PCR: NOT DETECTED

## 2021-01-07 ENCOUNTER — OFFICE VISIT (OUTPATIENT)
Dept: INTERNAL MEDICINE | Facility: CLINIC | Age: 26
End: 2021-01-07
Payer: COMMERCIAL

## 2021-01-07 VITALS
BODY MASS INDEX: 26.04 KG/M2 | TEMPERATURE: 98.5 F | HEART RATE: 68 BPM | OXYGEN SATURATION: 98 % | SYSTOLIC BLOOD PRESSURE: 102 MMHG | WEIGHT: 170 LBS | RESPIRATION RATE: 16 BRPM | DIASTOLIC BLOOD PRESSURE: 66 MMHG

## 2021-01-07 DIAGNOSIS — Z76.89 REFERRAL OF PATIENT: Primary | ICD-10-CM

## 2021-01-07 PROCEDURE — 99213 OFFICE O/P EST LOW 20 MIN: CPT | Performed by: INTERNAL MEDICINE

## 2021-01-07 SDOH — ECONOMIC STABILITY: FOOD INSECURITY: WITHIN THE PAST 12 MONTHS, THE FOOD YOU BOUGHT JUST DIDN'T LAST AND YOU DIDN'T HAVE MONEY TO GET MORE.: NOT ASKED

## 2021-01-07 SDOH — ECONOMIC STABILITY: INCOME INSECURITY: HOW HARD IS IT FOR YOU TO PAY FOR THE VERY BASICS LIKE FOOD, HOUSING, MEDICAL CARE, AND HEATING?: NOT ASKED

## 2021-01-07 SDOH — HEALTH STABILITY: MENTAL HEALTH: HOW MANY STANDARD DRINKS CONTAINING ALCOHOL DO YOU HAVE ON A TYPICAL DAY?: 1 OR 2

## 2021-01-07 SDOH — HEALTH STABILITY: MENTAL HEALTH: HOW OFTEN DO YOU HAVE A DRINK CONTAINING ALCOHOL?: MONTHLY OR LESS

## 2021-01-07 SDOH — ECONOMIC STABILITY: FOOD INSECURITY: WITHIN THE PAST 12 MONTHS, YOU WORRIED THAT YOUR FOOD WOULD RUN OUT BEFORE YOU GOT MONEY TO BUY MORE.: NOT ASKED

## 2021-01-07 SDOH — HEALTH STABILITY: MENTAL HEALTH: HOW OFTEN DO YOU HAVE 6 OR MORE DRINKS ON ONE OCCASION?: NOT ASKED

## 2021-01-07 SDOH — ECONOMIC STABILITY: TRANSPORTATION INSECURITY
IN THE PAST 12 MONTHS, HAS THE LACK OF TRANSPORTATION KEPT YOU FROM MEDICAL APPOINTMENTS OR FROM GETTING MEDICATIONS?: NOT ASKED

## 2021-01-07 SDOH — ECONOMIC STABILITY: TRANSPORTATION INSECURITY
IN THE PAST 12 MONTHS, HAS LACK OF TRANSPORTATION KEPT YOU FROM MEETINGS, WORK, OR FROM GETTING THINGS NEEDED FOR DAILY LIVING?: NOT ASKED

## 2021-01-07 NOTE — PROGRESS NOTES
"  ASSESSMENT/PLAN                                                      (Z76.89) Referral of patient  (primary encounter diagnosis)  Comment: labs evaluating for inflammatory markers, kidney and liver function, electrolytes, vitamin deficiencies, thyroid level, and blood counts recommended but declined by patient; lyme testing already performed and negative but patient believes it may be a \"false negative because those occur 60% of the time\" (not true); he declines repeat lyme testing; he is eager to meet \"with as many specialists as possible because I have good insurance now;\" discussed with patient that I do not feel comfortable referring him to rheumatology without further laboratory evaluation, physical findings, or a history suggestive of a rheumatologic diagnosis; suggested him meeting with either a sports medicine specialist or PT re: his musculoskeletal complaints - he is agreeable to both referrals but is skeptical re: their input because \"they always say I'm fine;\" suggested an allergy referral to evaluate for food allergies since his pain seems to flair after eating certain meals - he is agreeable.  Plan: multiple referrals placed - patient to schedule.    25 minutes spent on the day of encounter performing chart review, patient visit, and documentation.     Bel Leach MD   [address] [address]  T: [phone], F: [phone]    SUBJECTIVE                                                      Tyrone Whitfield is a very pleasant 25 year old male who presents for a rheumatology referral:    PMH, PSH, FH, SH, medications, allergies, immunizations, and preventative health measures reviewed and updated as appropriate.    Patient complains of chronic nagging, burning, and throbbing pain involving his left, lateral, upper back, bilateral SI joints, and his left achilles. No joint redness or swelling. No rash. No fevers or chills. Reports that pain " flairs after certain meals.     OBJECTIVE                                                      /66 (BP Location: Left arm, Patient Position: Chair, Cuff Size: Adult Regular)   Pulse 68   Temp 98.5  F (36.9  C) (Temporal)   Resp 16   Wt 77.1 kg (170 lb)   SpO2 98%   BMI 26.04 kg/m    Constitutional: well-appearing  Psych: normal mood and affect; normal judgment and insight; recent and remote memory intact    ---    (Note documentation was completed, in part, with [employer] voice-recognition software. Documentation was reviewed, but some grammatical, spelling, and word errors may remain.)

## 2021-01-08 ENCOUNTER — THERAPY VISIT (OUTPATIENT)
Dept: PHYSICAL THERAPY | Facility: CLINIC | Age: 26
End: 2021-01-08
Attending: INTERNAL MEDICINE
Payer: COMMERCIAL

## 2021-01-08 DIAGNOSIS — Z76.89 REFERRAL OF PATIENT: ICD-10-CM

## 2021-01-08 DIAGNOSIS — M79.10 MUSCLE PAIN: ICD-10-CM

## 2021-01-08 PROCEDURE — 97110 THERAPEUTIC EXERCISES: CPT | Mod: GP | Performed by: PHYSICAL THERAPIST

## 2021-01-08 PROCEDURE — 97161 PT EVAL LOW COMPLEX 20 MIN: CPT | Mod: GP | Performed by: PHYSICAL THERAPIST

## 2021-01-08 ASSESSMENT — ACTIVITIES OF DAILY LIVING (ADL)
WALKING_INITIALLY: NO DIFFICULTY AT ALL
HOS_ADL_ITEM_SCORE_TOTAL: 63
HOW_WOULD_YOU_RATE_YOUR_CURRENT_LEVEL_OF_FUNCTION_DURING_YOUR_USUAL_ACTIVITIES_OF_DAILY_LIVING_FROM_0_TO_100_WITH_100_BEING_YOUR_LEVEL_OF_FUNCTION_PRIOR_TO_YOUR_HIP_PROBLEM_AND_0_BEING_THE_INABILITY_TO_PERFORM_ANY_OF_YOUR_USUAL_DAILY_ACTIVITIES?: 90
STANDING_FOR_15_MINUTES: NO DIFFICULTY AT ALL
SITTING_FOR_15_MINUTES: NO DIFFICULTY AT ALL
GOING_UP_1_FLIGHT_OF_STAIRS: NO DIFFICULTY AT ALL
DEEP_SQUATTING: SLIGHT DIFFICULTY
GETTING_INTO_AND_OUT_OF_AN_AVERAGE_CAR: NO DIFFICULTY AT ALL
RECREATIONAL_ACTIVITIES: MODERATE DIFFICULTY
HOS_ADL_HIGHEST_POTENTIAL_SCORE: 68
STEPPING_UP_AND_DOWN_CURBS: NO DIFFICULTY AT ALL
ROLLING_OVER_IN_BED: NO DIFFICULTY AT ALL
PUTTING_ON_SOCKS_AND_SHOES: NO DIFFICULTY AT ALL
TWISTING/PIVOTING_ON_INVOLVED_LEG: NO DIFFICULTY AT ALL
WALKING_APPROXIMATELY_10_MINUTES: NO DIFFICULTY AT ALL
WALKING_DOWN_STEEP_HILLS: NO DIFFICULTY AT ALL
GOING_DOWN_1_FLIGHT_OF_STAIRS: NO DIFFICULTY AT ALL
HEAVY_WORK: MODERATE DIFFICULTY
LIGHT_TO_MODERATE_WORK: NO DIFFICULTY AT ALL
HOS_ADL_COUNT: 17
WALKING_15_MINUTES_OR_GREATER: NO DIFFICULTY AT ALL
GETTING_INTO_AND_OUT_OF_A_BATHTUB: NO DIFFICULTY AT ALL
HOS_ADL_SCORE(%): 92.65
WALKING_UP_STEEP_HILLS: NO DIFFICULTY AT ALL

## 2021-01-08 NOTE — PROGRESS NOTES
[facility] Initial Evaluation  Subjective:  The history is provided by the patient.   Therapist Generated HPI Evaluation  Problem details: Pt comes in with complains of chronic history of joint pain and muscle aches.  This has been ongoing since age 19 years starting in his  B hips/glut area and over the years has progressively worsened.  He will also complain of L posterior shoulder pain near his teres and infraspinatus muscles.  He describes it as tingling pain in L shoulder but does not have any numbness or tingling down his arm.  He injured his L achilles tendon a year ago jumping onto a platform.  Boomer pain in achilles area, reported there was swelling.  Would worsen with activity, running, walking.  Reports he can feel a bump on his achilles.  He will work it out which will lessen pain but can sometimes cause tingling.  He has tried to improve his achilles pain but perform single leg heel raises and reports his strength has finally improved to perform them on his Left leg.  He recently performed single leg squat on his R leg and now is complaining of R quad pain.  Pt works as a .  Will try to lift/workout but then feels it can exacerbate his pain.  Has tried foam rolling which helps but also sometimes can make it worse.  Reports also that certain meals can aggravate his pain.  He has tried whole 30 diet and reports when he eats wheat, dairy and sugar, can feel his symptoms worsen.  Pt denies any bowel/bladder changes.  Denies any sexual dysfunction or pain.  Pain can be about 5-6/10.    Has been tested for lyme's disease, results negative.  .     Tyrone Whitfield is a 25 year old male with muscle pain condition which occurred with .   This is a chronic condition.  Where condition occurred: during recreation/sport.        Patient reports pain:  Left shoulder, left foot/ankle, right hip and left hip (R quad). Pain is described as aching (throbbing) and is intermittent. Radiates  to: NA.  Pain is the same all the time.  Associated with: throbbing, ache.  Symptoms are exacerbated by bending/squatting and running (lifting)  and relieved by rest (light massage).                      Since onset symptoms are unchanged.                                    Objective:  Standing Alignment:      Shoulder/UE:  Scapular abduction L              Gait:    Gait Type:  Normal         Flexibility/Screens:   Positive screens:  ShoulderNegative screens: Cervical; Thoracic or Lumbar   Upper Extremity:    Decreased left upper extremity flexibility at:  Latissimus    Lower Extremity:  Decreased left lower extremity flexibility:Piriformis; Gastroc and Soleus    Decreased right lower extremity flexibility:  Piriformis and Quadriceps          Ankle/Foot Evaluation  ROM:  AROM is normal.PROM is normal.      Strength is normal (able to perform 10 reps SLHR bilaterally and painfree).  LIGAMENT TESTING: not assessed                PALPATION: Palpation of ankle: mild tender and hypertrophy/thickened middle of L achilles tendon.  Left ankle tenderness present at:  achilles tendon    EDEMA: normal          MOBILITY TESTING: normal                   Lumbar/SI Evaluation  ROM:  AROM Lumbar: normal      Lumbar Myotomes:  normal                                 Cervical/Thoracic Evaluation  Cervical AROM: normal   Thoracic AROM: normal        Cervical Myotomes:  normal                                       Shoulder Evaluation:  ROM:  AROM:  normal                            PROM:  normal (mild firm endfeel endrange flexion on L)                                Strength:  normal (does present with weakness in B scapular stabilizing muscles L > R)                      Stability Testing:  normal      Special Tests:  normal      Palpation:    Left shoulder tenderness present at:  Infraspinatus; Teres Minor and Subscapularis    Mobility Tests:      Glenohumeral posterior left:  Hypomobile        Scapulothoracic left:  Hypermobile                               Hip Evaluation  Hip PROM:  Hip PROM:  Left Hip:    Normal  Right Hip:  Normal                          Hip Strength:  Hip Strength:    Left:    Normal  Right:  Normal                            Hip Palpation:    Left hip tenderness present at:   Piriformis  Right hip tenderness present at:  Piriformis  Functional Testing:          Quad:    Single leg squat:   Left:    Mild loss of control  Right:   Excessive anterior knee excursion, decreased hip/trunk flexion and mild loss of control    Bilateral leg squat:  Anterior pelvic tilt, lumbar lordosis  Excessive anterior knee excursion and decreased hip/trunk flexion                  General     ROS    Assessment/Plan:    Patient is a 25 year old male with left side shoulder, both sides hip, right side knee and left side ankle complaints.    Patient has the following significant findings with corresponding treatment plan.                Diagnosis 1:  L scapular  Pain -  manual therapy, self management, education and home program  Decreased ROM/flexibility - manual therapy, therapeutic exercise, therapeutic activity and home program  Decreased strength - therapeutic exercise, therapeutic activities and home program  Decreased proprioception - neuro re-education, therapeutic activities and home program  Impaired muscle performance - neuro re-education and home program  Decreased function - therapeutic activities and home program  Diagnosis 2:  R quad pain   Pain -  manual therapy, self management, education and home program  Decreased strength - therapeutic exercise, therapeutic activities and home program  Impaired muscle performance - neuro re-education and home program  Decreased function - therapeutic activities and home program  Diagnosis 3:  B hip pain  Pain -  manual therapy, self management, education and home program  Decreased ROM/flexibility - manual therapy, therapeutic exercise, therapeutic activity and home program  Decreased  strength - therapeutic exercise, therapeutic activities and home program  Impaired muscle performance - neuro re-education and home program  Decreased function - therapeutic activities and home program   Diagnosis 4:  L achilles pain  Pain -  manual therapy, self management, education and home program  Decreased ROM/flexibility - manual therapy, therapeutic exercise, therapeutic activity and home program  Decreased function - therapeutic activities and home program    Therapy Evaluation Codes:   1) History comprised of:   Personal factors that impact the plan of care:      Past/current experiences and Time since onset of symptoms.    Comorbidity factors that impact the plan of care are:      None.     Medications impacting care: Anti-depressant and Anti-inflammatory.  2) Examination of Body Systems comprised of:   Body structures and functions that impact the plan of care:      Hip, Shoulder and L achilles pain, R quad.   Activity limitations that impact the plan of care are:      Squatting/kneeling, Walking and lifting.  3) Clinical presentation characteristics are:   Stable/Uncomplicated.  4) Decision-Making    Low complexity using standardized patient assessment instrument and/or measureable assessment of functional outcome.  Cumulative Therapy Evaluation is: Low complexity.    Previous and current functional limitations:  (See Goal Flow Sheet for this information)    Short term and Long term goals: (See Goal Flow Sheet for this information)     Communication ability:  Patient appears to be able to clearly communicate and understand verbal and written communication and follow directions correctly.  Treatment Explanation - The following has been discussed with the patient:   RX ordered/plan of care  Anticipated outcomes  Possible risks and side effects  This patient would benefit from PT intervention to resume normal activities.   Rehab potential is good.    Frequency:  2 X week, once daily  Duration:  for 3  months  Discharge Plan:  Achieve all LTG.  Independent in home treatment program.  Reach maximal therapeutic benefit.    Please refer to the daily flowsheet for treatment today, total treatment time and time spent performing 1:1 timed codes.

## 2021-01-12 NOTE — PROGRESS NOTES
[place] [facility] Initial Evaluation  Subjective:    Patient Health History                         Current medications:  Anti-depressants.    Current occupation is .   Primary job tasks include:  Lifting/carrying, driving, prolonged standing and pushing/pulling.                                    Objective:  System    Physical Exam    General     ROS    Assessment/Plan:

## 2021-01-14 ENCOUNTER — TRANSFERRED RECORDS (OUTPATIENT)
Dept: HEALTH INFORMATION MANAGEMENT | Facility: CLINIC | Age: 26
End: 2021-01-14

## 2021-01-14 ENCOUNTER — ANCILLARY PROCEDURE (OUTPATIENT)
Dept: GENERAL RADIOLOGY | Facility: CLINIC | Age: 26
End: 2021-01-14
Attending: FAMILY MEDICINE
Payer: COMMERCIAL

## 2021-01-14 ENCOUNTER — OFFICE VISIT (OUTPATIENT)
Dept: ORTHOPEDICS | Facility: CLINIC | Age: 26
End: 2021-01-14
Attending: INTERNAL MEDICINE
Payer: COMMERCIAL

## 2021-01-14 VITALS
HEIGHT: 68 IN | DIASTOLIC BLOOD PRESSURE: 72 MMHG | BODY MASS INDEX: 25.76 KG/M2 | SYSTOLIC BLOOD PRESSURE: 116 MMHG | WEIGHT: 170 LBS

## 2021-01-14 DIAGNOSIS — G89.29 CHRONIC PAIN OF RIGHT KNEE: ICD-10-CM

## 2021-01-14 DIAGNOSIS — G89.29 CHRONIC LEFT SHOULDER PAIN: ICD-10-CM

## 2021-01-14 DIAGNOSIS — M54.12 LEFT CERVICAL RADICULOPATHY: ICD-10-CM

## 2021-01-14 DIAGNOSIS — G89.29 OTHER CHRONIC PAIN: ICD-10-CM

## 2021-01-14 DIAGNOSIS — M25.512 CHRONIC LEFT SHOULDER PAIN: ICD-10-CM

## 2021-01-14 DIAGNOSIS — M25.561 CHRONIC PAIN OF RIGHT KNEE: ICD-10-CM

## 2021-01-14 DIAGNOSIS — M54.12 LEFT CERVICAL RADICULOPATHY: Primary | ICD-10-CM

## 2021-01-14 PROCEDURE — 72040 X-RAY EXAM NECK SPINE 2-3 VW: CPT | Performed by: RADIOLOGY

## 2021-01-14 PROCEDURE — 99244 OFF/OP CNSLTJ NEW/EST MOD 40: CPT | Performed by: FAMILY MEDICINE

## 2021-01-14 PROCEDURE — 73562 X-RAY EXAM OF KNEE 3: CPT | Performed by: RADIOLOGY

## 2021-01-14 ASSESSMENT — MIFFLIN-ST. JEOR: SCORE: 1726.64

## 2021-01-14 NOTE — LETTER
"    1/14/2021         RE: Tyrone Whitfield  [address] [facility] [address] [facility] [address]        Dear Colleague,    Thank you for referring your patient, Tyrone Whitfield, to the [facility] [place]. Please see a copy of my visit note below.    ASSESSMENT & PLAN    1. Left cervical radiculopathy    2. Chronic left shoulder pain    3. Chronic pain of right knee      Seen in consultation for multiple complaints - chronic left posterior shoulder numbness, chronic right knee burning, widespread \"pain\" and concerns about gut health  Left posterior shoulder numbness appears related to the neck or consideration for thoracic outlet  Reviewed xray - normal alignment without any disc space narrowing. Given chronicity recommend advanced imaging. MRI cervical spine ordered.  Knee pain likely related to quad tendinopathy. Given chronicity at risk for partial tearing and recommend advanced imaging. MRI right knee ordered.   Schedule MRIs with [facility] ([phone]). Once you know the date of your MRI, please call the office at [phone] and schedule a follow-up visit for 2 days after that with Dr. Yeo  Xrays obtained and reviewed: yes  Tried NSAIDs: yes  6 weeks of physician directed physical therapy completed within the last 4 months: yes  In regards to widespread pain recommend pain management referral which was placed.  In regards to gut health recommend searching internet / [facility] for physicians that deal with gut health - can discuss concept of leaky gut and/or explore testing for celiac disease    -----    SUBJECTIVE  Tyrone Whitfield is a/an 25 year old Right handed male who is seen in consultation at the request of  Bel Leach M.D. for evaluation of left shoulder pain and right distal quad burning. The patient is seen by themselves.    Onset: left shoulder burning and tingling 5 years ago, right distal quad burning onset 1 year ago. Patient describes injury as performing an overhead " "press, feeling a jolt of pain in the left supraspinatus and posterior deltoid region.  He reports improvement of burning and discomfort after 9 months. He reports the left shoulder pain is no longer constant, and worsens with activities.  Right knee injury while performing single leg press, feeling a jolt of pain in the distal quad/ superolateral aspect of the knee.   Location of Pain: left shoulder, right knee   Worsened by: overhead press, lateral raises, and planks,  Right knee pain worsens after performing squats, and pain will last ~24 hours.   Better with: rest, activity avoidance, heat creams, ice acutely  Treatments tried: rest/activity avoidance, ice, ibuprofen and Aleve  Associated symptoms: burning, knee instability  Orthopedic history: yes right knee pes anserine bursitis   Relevant surgical history: NO  Patient Social History: works as , owns his own company.    Patient's past medical, surgical, social, and family histories were reviewed today and no pertinent history related to patient's presenting problem.    REVIEW OF SYSTEMS:  10 point ROS is negative other than symptoms noted above in HPI, Past Medical History or as stated below  Constitutional: NEGATIVE for fever, chills, change in weight  Skin: NEGATIVE for worrisome rashes, moles or lesions  GI/: NEGATIVE for bowel or bladder changes  Neuro: NEGATIVE for weakness, dizziness or paresthesias    OBJECTIVE:  /72 (BP Location: Right arm, Patient Position: Chair, Cuff Size: Adult Regular)   Ht 1.721 m (5' 7.75\")   Wt 77.1 kg (170 lb)   BMI 26.04 kg/m     General: healthy, alert and in no distress  HEENT: no scleral icterus or conjunctival erythema  Skin: no suspicious lesions or rash. No jaundice.  CV: regular rhythm by palpation  Resp: normal respiratory effort without conversational dyspnea   Psych: normal mood and affect  Gait: normal steady gait with appropriate coordination and balance  Neuro: normal light touch sensory " exam of the bilateral upper extremities.    MSK:  LEFT SHOULDER  Inspection:    no atrophy  Palpation:    Tender about the posterior capsule, medial border of scapula. Remainder of bony and tendinous landmarks are nontender.  Active Range of Motion:     Abduction 1800, FF 1800,   Strength:    Scapular plane abduction 5/5,  ER 5/5, IR 5/5, biceps 5/5  Special Tests:    Positive: Symone (TOS)    Negative: Roberson', supraspinatus (empty can), crossed arm adduction and Speed's    CERVICAL SPINE  Range of Motion:     Flexion full    Extension full  Strength:    Deltoid (C5) 5/5, biceps (C6) 5/5, wrist extension (C6) 5/5, triceps (C7) 5/5, wrist flexion (C7) 5/5, finger flexion (C8) 5/5  Special Tests:    Positive: Spurling's (left) superior / posterior shoulder    RIGHT KNEE  Inspection:    normal alignment, increased knee valgus with one-legged squat  Palpation:    Tender about the VMO/quad insertion. Remainder of bony and ligamentous landmarks are nontender.    No effusion is present    Patellofemoral crepitus is Absent  Range of Motion:     00 extension to 1350 flexion  Strength:    Quadriceps 5/5    Extensor mechanism intact  Special Tests:    Negative: Patellar grind, patellar apprehension, MCL/valgus stress (0 & 30 deg), LCL/varus stress (0 & 30 deg), Lachman's, posterior drawer, Margarita's    Independent visualization of the below image:  Recent Results (from the past 24 hour(s))   XR Cervical Spine 2/3 vws    Narrative    CERVICAL SPINE TWO TO THREE VIEWS  1/14/2021 6:25 PM     HISTORY: Left cervical radiculopathy.    COMPARISON: July 31, 2015      Impression    IMPRESSION: Cervical vertebrae are normally aligned through the C7/T1  junction. No prevertebral soft tissue swelling. No acute fracture.   XR Knee Standing AP Bilat [place]ins Bilat Lat Right    Narrative    Examination:  XR KNEE STANDING AP BILAT SUNRISE BILAT LAT RT    Date:  1/14/2021 6:32 PM     Clinical Information: Chronic right knee pain..      Comparison: none.      Impression    Impression:    1.  Normal right knee. Normal joint alignment and spacing. No  significant joint effusion. No fracture or bone lesion.  2.  Frontal and patellar views of the left knee are normal.    MD Mu VALLES,  [facility]      Again, thank you for allowing me to participate in the care of your patient.        Sincerely,        Mu Wolf DO

## 2021-01-14 NOTE — PROGRESS NOTES
"ASSESSMENT & PLAN    1. Left cervical radiculopathy    2. Chronic left shoulder pain    3. Chronic pain of right knee      Seen in consultation for multiple complaints - chronic left posterior shoulder numbness, chronic right knee burning, widespread \"pain\" and concerns about gut health  Left posterior shoulder numbness appears related to the neck or consideration for thoracic outlet  Reviewed xray - normal alignment without any disc space narrowing. Given chronicity recommend advanced imaging. MRI cervical spine ordered.  Knee pain likely related to quad tendinopathy. Given chronicity at risk for partial tearing and recommend advanced imaging. MRI right knee ordered.   Schedule MRIs with [facility] ([phone]). Once you know the date of your MRI, please call the office at [phone] and schedule a follow-up visit for 2 days after that with Dr. Yeo  Xrays obtained and reviewed: yes  Tried NSAIDs: yes  6 weeks of physician directed physical therapy completed within the last 4 months: yes  In regards to widespread pain recommend pain management referral which was placed.  In regards to gut health recommend searching internet / [employer] for physicians that deal with gut health - can discuss concept of leaky gut and/or explore testing for celiac disease    -----    SUBJECTIVE  Tyrone Whitfield is a/an 25 year old Right handed male who is seen in consultation at the request of  Bel Leach M.D. for evaluation of left shoulder pain and right distal quad burning. The patient is seen by themselves.    Onset: left shoulder burning and tingling 5 years ago, right distal quad burning onset 1 year ago. Patient describes injury as performing an overhead press, feeling a jolt of pain in the left supraspinatus and posterior deltoid region.  He reports improvement of burning and discomfort after 9 months. He reports the left shoulder pain is no longer constant, and worsens with activities.  Right knee injury while performing " "single leg press, feeling a jolt of pain in the distal quad/ superolateral aspect of the knee.   Location of Pain: left shoulder, right knee   Worsened by: overhead press, lateral raises, and planks,  Right knee pain worsens after performing squats, and pain will last ~24 hours.   Better with: rest, activity avoidance, heat creams, ice acutely  Treatments tried: rest/activity avoidance, ice, ibuprofen and Aleve  Associated symptoms: burning, knee instability  Orthopedic history: yes right knee pes anserine bursitis   Relevant surgical history: NO  Patient Social History: works as , owns his own company.    Patient's past medical, surgical, social, and family histories were reviewed today and no pertinent history related to patient's presenting problem.    REVIEW OF SYSTEMS:  10 point ROS is negative other than symptoms noted above in HPI, Past Medical History or as stated below  Constitutional: NEGATIVE for fever, chills, change in weight  Skin: NEGATIVE for worrisome rashes, moles or lesions  GI/: NEGATIVE for bowel or bladder changes  Neuro: NEGATIVE for weakness, dizziness or paresthesias    OBJECTIVE:  /72 (BP Location: Right arm, Patient Position: Chair, Cuff Size: Adult Regular)   Ht 1.721 m (5' 7.75\")   Wt 77.1 kg (170 lb)   BMI 26.04 kg/m     General: healthy, alert and in no distress  HEENT: no scleral icterus or conjunctival erythema  Skin: no suspicious lesions or rash. No jaundice.  CV: regular rhythm by palpation  Resp: normal respiratory effort without conversational dyspnea   Psych: normal mood and affect  Gait: normal steady gait with appropriate coordination and balance  Neuro: normal light touch sensory exam of the bilateral upper extremities.    MSK:  LEFT SHOULDER  Inspection:    no atrophy  Palpation:    Tender about the posterior capsule, medial border of scapula. Remainder of bony and tendinous landmarks are nontender.  Active Range of Motion:     Abduction 1800, FF " 1800,   Strength:    Scapular plane abduction 5/5,  ER 5/5, IR 5/5, biceps 5/5  Special Tests:    Positive: Symone (TOS)    Negative: Roberson', supraspinatus (empty can), crossed arm adduction and Speed's    CERVICAL SPINE  Range of Motion:     Flexion full    Extension full  Strength:    Deltoid (C5) 5/5, biceps (C6) 5/5, wrist extension (C6) 5/5, triceps (C7) 5/5, wrist flexion (C7) 5/5, finger flexion (C8) 5/5  Special Tests:    Positive: Spurling's (left) superior / posterior shoulder    RIGHT KNEE  Inspection:    normal alignment, increased knee valgus with one-legged squat  Palpation:    Tender about the VMO/quad insertion. Remainder of bony and ligamentous landmarks are nontender.    No effusion is present    Patellofemoral crepitus is Absent  Range of Motion:     00 extension to 1350 flexion  Strength:    Quadriceps 5/5    Extensor mechanism intact  Special Tests:    Negative: Patellar grind, patellar apprehension, MCL/valgus stress (0 & 30 deg), LCL/varus stress (0 & 30 deg), Lachman's, posterior drawer, Margarita's    Independent visualization of the below image:  Recent Results (from the past 24 hour(s))   XR Cervical Spine 2/3 vws    Narrative    CERVICAL SPINE TWO TO THREE VIEWS  1/14/2021 6:25 PM     HISTORY: Left cervical radiculopathy.    COMPARISON: July 31, 2015      Impression    IMPRESSION: Cervical vertebrae are normally aligned through the C7/T1  junction. No prevertebral soft tissue swelling. No acute fracture.   XR Knee Standing AP Bilat [place] Bilat Lat Right    Narrative    Examination:  XR KNEE STANDING AP BILAT [place] BILAT LAT RT    Date:  1/14/2021 6:32 PM     Clinical Information: Chronic right knee pain..     Comparison: none.      Impression    Impression:    1.  Normal right knee. Normal joint alignment and spacing. No  significant joint effusion. No fracture or bone lesion.  2.  Frontal and patellar views of the left knee are normal.    MD Mu VALLES  DO BULL Wolf  [facility]

## 2021-01-15 ENCOUNTER — HEALTH MAINTENANCE LETTER (OUTPATIENT)
Age: 26
End: 2021-01-15

## 2021-01-18 ENCOUNTER — TELEPHONE (OUTPATIENT)
Dept: ORTHOPEDICS | Facility: CLINIC | Age: 26
End: 2021-01-18

## 2021-01-18 DIAGNOSIS — F40.240 CLAUSTROPHOBIA: Primary | ICD-10-CM

## 2021-01-18 RX ORDER — DIAZEPAM 10 MG
10 TABLET ORAL
Qty: 1 TABLET | Refills: 0 | Status: SHIPPED | OUTPATIENT
Start: 2021-01-18 | End: 2021-10-19

## 2021-01-18 NOTE — TELEPHONE ENCOUNTER
Left voicemail informing patient that prescription for Valium was sent to his pharmacy.     CHARLES Sheldon RN

## 2021-01-18 NOTE — TELEPHONE ENCOUNTER
Patient calls stating Dr. Wolf ordered a cervical and right knee MRI. He has difficulty holding still due to Tourette's and Radiology Scheduling recommended he call to get something to help him relax. They would not schedule until he called first.   His last MRI was when he was about 6 and he wasn't able to complete it at that time due moving too much with Tourette's.    Informed we usually prescribe Valium to be taken 30 to 60 minute prior to the MRI on site at Radiology. Also informed he would need a . He plans to do both at the same time if he is able to schedule them that way.    Will discuss with provider and get back with him.   He can be reached at: [phone]  Ok to leave message.     Please see valium teed up if appropriate.     CHARLES Sheldon RN

## 2021-01-19 ENCOUNTER — THERAPY VISIT (OUTPATIENT)
Dept: PHYSICAL THERAPY | Facility: CLINIC | Age: 26
End: 2021-01-19
Payer: COMMERCIAL

## 2021-01-19 DIAGNOSIS — M79.10 MUSCLE PAIN: ICD-10-CM

## 2021-01-19 PROCEDURE — 97110 THERAPEUTIC EXERCISES: CPT | Mod: GP | Performed by: PHYSICAL THERAPIST

## 2021-01-19 PROCEDURE — 97530 THERAPEUTIC ACTIVITIES: CPT | Mod: GP | Performed by: PHYSICAL THERAPIST

## 2021-01-26 ENCOUNTER — THERAPY VISIT (OUTPATIENT)
Dept: PHYSICAL THERAPY | Facility: CLINIC | Age: 26
End: 2021-01-26
Payer: COMMERCIAL

## 2021-01-26 DIAGNOSIS — M79.10 MUSCLE PAIN: ICD-10-CM

## 2021-01-26 PROCEDURE — 97110 THERAPEUTIC EXERCISES: CPT | Mod: GP | Performed by: PHYSICAL THERAPIST

## 2021-01-26 PROCEDURE — 97112 NEUROMUSCULAR REEDUCATION: CPT | Mod: GP | Performed by: PHYSICAL THERAPIST

## 2021-01-27 ENCOUNTER — HOSPITAL ENCOUNTER (OUTPATIENT)
Dept: MRI IMAGING | Facility: CLINIC | Age: 26
Discharge: HOME | End: 2021-01-27
Attending: FAMILY MEDICINE | Admitting: FAMILY MEDICINE
Payer: COMMERCIAL

## 2021-01-27 DIAGNOSIS — M54.12 LEFT CERVICAL RADICULOPATHY: ICD-10-CM

## 2021-01-27 DIAGNOSIS — M25.561 CHRONIC PAIN OF RIGHT KNEE: ICD-10-CM

## 2021-01-27 DIAGNOSIS — G89.29 CHRONIC LEFT SHOULDER PAIN: ICD-10-CM

## 2021-01-27 DIAGNOSIS — G89.29 CHRONIC PAIN OF RIGHT KNEE: ICD-10-CM

## 2021-01-27 DIAGNOSIS — M25.512 CHRONIC LEFT SHOULDER PAIN: ICD-10-CM

## 2021-01-27 PROCEDURE — 73721 MRI JNT OF LWR EXTRE W/O DYE: CPT | Mod: RT

## 2021-01-27 PROCEDURE — 73721 MRI JNT OF LWR EXTRE W/O DYE: CPT | Mod: 26 | Performed by: RADIOLOGY

## 2021-01-27 PROCEDURE — 72141 MRI NECK SPINE W/O DYE: CPT

## 2021-01-28 ENCOUNTER — TELEPHONE (OUTPATIENT)
Dept: ORTHOPEDICS | Facility: CLINIC | Age: 26
End: 2021-01-28

## 2021-01-28 NOTE — TELEPHONE ENCOUNTER
Called and went over results and recommendations. Patient would like to have an appt with Dr. Yeo. Got him on the schedule for 2/3/21 at 2 pm.    Joyce Le ATC

## 2021-01-28 NOTE — TELEPHONE ENCOUNTER
Patient of Dr. Wolf's.    Please notify him that neck MRI is normal, and knee MRI showed some mild arthritis and mild muscle strain.    Recommend he return to follow-up with a new physician regarding next steps or proceed with pain management referral as given by Dr. Wolf.

## 2021-02-02 ENCOUNTER — THERAPY VISIT (OUTPATIENT)
Dept: PHYSICAL THERAPY | Facility: CLINIC | Age: 26
End: 2021-02-02
Payer: COMMERCIAL

## 2021-02-02 DIAGNOSIS — M79.10 MUSCLE PAIN: ICD-10-CM

## 2021-02-02 PROCEDURE — 97112 NEUROMUSCULAR REEDUCATION: CPT | Mod: GP | Performed by: PHYSICAL THERAPIST

## 2021-02-02 PROCEDURE — 97110 THERAPEUTIC EXERCISES: CPT | Mod: GP | Performed by: PHYSICAL THERAPIST

## 2021-02-03 ENCOUNTER — OFFICE VISIT (OUTPATIENT)
Dept: ORTHOPEDICS | Facility: CLINIC | Age: 26
End: 2021-02-03
Payer: COMMERCIAL

## 2021-02-03 VITALS
SYSTOLIC BLOOD PRESSURE: 110 MMHG | BODY MASS INDEX: 25.76 KG/M2 | DIASTOLIC BLOOD PRESSURE: 68 MMHG | HEIGHT: 68 IN | WEIGHT: 170 LBS

## 2021-02-03 DIAGNOSIS — M79.604 RIGHT LEG PAIN: Primary | ICD-10-CM

## 2021-02-03 PROCEDURE — 99214 OFFICE O/P EST MOD 30 MIN: CPT | Performed by: FAMILY MEDICINE

## 2021-02-03 RX ORDER — METHYLPREDNISOLONE 4 MG
TABLET, DOSE PACK ORAL
Qty: 21 TABLET | Refills: 0 | Status: SHIPPED | OUTPATIENT
Start: 2021-02-03 | End: 2021-10-19

## 2021-02-03 ASSESSMENT — MIFFLIN-ST. JEOR: SCORE: 1726.64

## 2021-02-03 NOTE — PROGRESS NOTES
ASSESSMENT & PLAN  Patient Instructions     1. Right leg pain      -Patient has chronic right lower quadricep pain due to an unknown etiology  -MRI of the right knee showed mild wearing down of the cartilage in the knee and no tearing or abnormalities of the surrounding musculature  -Patient states a burning pain after physical activities in the VMO, not in the knee.  -Patient is a  who exercises regularly for years and should not suffer from this chronic pain  -Patient has tried several NSAIDs without relief.  Patient will start an oral steroid taper.  -Patient will get a muscle test and nerve conduction study  -Patient will start blood flow restriction therapy with David Vela at [place].  -Patient will follow up in approximately 4 weeks via phone for reevaluation and review of EMG nerve conduction study test results  -To consider second opinion with Dr. Guzman if no improvement  -Call direct clinic number [[phone]] at any time with questions or concerns.    Albert Yeo MD [facility]  [facility]          -----    SUBJECTIVE:  Tyrone Whitfield is a 25 year old male who is seen in follow-up for left shoulder pain and right distal quad burning.They were last seen by Dr. Wolf on 1/14/21.     Since their last visit reports 0% - (About the same as last time). Shoulder seems to be more stable, states no change in the leg. They indicate that their current pain level is 3/10. They have tried ice, home exercises, physical therapy (3 visits), previous imaging (MRI 1/27/21) and foam rolling, and creams.      The patient is seen by themselves.    Patient's past medical, surgical, social, and family histories were reviewed today and no changes are noted.    REVIEW OF SYSTEMS:  Constitutional: NEGATIVE for fever, chills, change in weight  Skin: NEGATIVE for worrisome rashes, moles or lesions  GI/: NEGATIVE for bowel or bladder changes  Neuro: NEGATIVE  "for weakness, dizziness or paresthesias    OBJECTIVE:  /68   Ht 1.721 m (5' 7.75\")   Wt 77.1 kg (170 lb)   BMI 26.04 kg/m     General: healthy, alert and in no distress  HEENT: no scleral icterus or conjunctival erythema  Skin: no suspicious lesions or rash. No jaundice.  CV: regular rhythm by palpation, no pedal edema  Resp: normal respiratory effort without conversational dyspnea   Psych: normal mood and affect  Gait: normal steady gait with appropriate coordination and balance  Neuro: normal light touch sensory exam of the extremities.    MSK:  RIGHT KNEE  Inspection:    Normal alignment; no edema, erythema, or ecchymosis present  Palpation:    bony and ligamentous landmarks are nontender. Slightly decreased muscle mass compared to left quad    No effusion is present    Patellofemoral crepitus is Absent  Range of Motion:     00 extension to 1350 flexion  Strength:    Quadriceps grossly intact    Extensor mechanism intact  Special Tests:    Positive: none    Negative: MCL/valgus stress (0 & 30 deg), LCL/varus stress (0 & 30 deg), Lachman's, anterior drawer, posterior drawer, Margarita's    Independent visualization of the below image:  MRI Cervical spine w/o contrast [[id number]] Resulted: 01/27/21 1717   Order Status: Completed Updated: 01/27/21 1718   Narrative:     MRI OF THE CERVICAL SPINE WITHOUT CONTRAST 1/27/2021 4:07 PM     COMPARISON: Cervical spine x-rays 1/14/2021.     HISTORY: Access for disc herniation; Left cervical radiculopathy;   Chronic left shoulder pain.     TECHNIQUE: Multiplanar, multisequence MRI images of the cervical spine   were acquired without intravenous contrast.     FINDINGS: Alignment, vertebral body heights and disc heights are   normal. Marrow signal and spinal cord signal are normal. The facets   articulate normally. The paraspinous soft tissues are unremarkable.   The cervicomedullary junction is patent. The spinal canal and neural   foramina are widely patent throughout " the cervical spine.    Impression:     IMPRESSION: Normal cervical spine MRI.     TOMMY RUIZ MD     MR Knee Right w/o Contrast [[id number]] Resulted: 01/27/21 1624   Order Status: Completed Updated: 01/27/21 1625   Narrative:     MR right knee without contrast 1/27/2021 4:17 PM     Techniques: Multiplanar multisequence imaging of the right knee was   obtained without administration of intra-articular or intravenous   contrast using routing protocol.     History: access for quad tendinopathy; Chronic pain of right knee;   Chronic pain of right knee     Comparison: 1/14/2021     Findings:     MENISCI:   Medial meniscus: Intact.   Lateral meniscus: Intact.     LIGAMENTS   Cruciate ligaments: Intact.   Medial supporting structures: Intact.   Lateral supporting structures: Mild edema deep to the iliotibial band,   query iliotibial band friction syndrome. Otherwise, the lateral   supporting structures are intact     EXTENSOR MECHANISM   Intact.     FLUID   No joint effusion. No substantial Baker's cyst.     OSSEOUS and ARTICULAR STRUCTURES   Bones: No fracture, contusion, or osseous lesion is seen. Foci of   hypointensities in the distal femur, likely bone islands.     Patellofemoral compartment: Increased T2 signal lateral patellar facet   articular cartilage, query low-grade chondromalacia.     Medial compartment: Signal heterogeneity with superficial chondral   loss of the posterior weightbearing surface of medial femoral condyle.     Lateral compartment: Signal heterogeneity posterior weightbearing   surface of the lateral femoral condyle.     ANCILLARY FINDINGS   Mild muscle edema of the soleus, likely related to muscle strain.    Impression:     Impression:   1. No tear or tendinosis of the quadriceps tendon.   2. Query iliotibial band friction syndrome.   3. 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 No   high-grade chondral loss.   4. Mild muscle edema of the soleus, likely related to muscle  strain.     ISAEL MELANI         Patient's conditions were thoroughly discussed during today's visit with greater than 50% of the visit spent counseling the patient with total time spent face-to-face with the patient being 26 minutes.    Albert Yeo MD, [facility]

## 2021-02-03 NOTE — PATIENT INSTRUCTIONS
1. Right leg pain      -Patient has chronic right lower quadricep pain due to an unknown etiology  -MRI of the right knee showed mild wearing down of the cartilage in the knee and no tearing or abnormalities of the surrounding musculature  -Patient states a burning pain after physical activities in the VMO, not in the knee.  -Patient is a  who exercises regularly for years and should not suffer from this chronic pain  -Patient has tried several NSAIDs without relief.  Patient will start an oral steroid taper.  -Patient will get a muscle test and nerve conduction study  -Patient will start blood flow restriction therapy with David Vela at [place].  -Patient will follow up in approximately 4 weeks via phone for reevaluation and review of EMG nerve conduction study test results  -To consider second opinion with Dr. Guzman if no improvement  -Call direct clinic number [[phone]] at any time with questions or concerns.    Albert Yeo MD CAQSM  [facility]  [facility] [place]

## 2021-02-03 NOTE — LETTER
2/3/2021         RE: Tyrone Whitfield  [id number] [address] [address]        Dear Colleague,    Thank you for referring your patient, Tyrone Whitfield, to the [facility] [place]. Please see a copy of my visit note below.    ASSESSMENT & PLAN  Patient Instructions     1. Right leg pain      -Patient has chronic right lower quadricep pain due to an unknown etiology  -MRI of the right knee showed mild wearing down of the cartilage in the knee and no tearing or abnormalities of the surrounding musculature  -Patient states a burning pain after physical activities in the VMO, not in the knee.  -Patient is a  who exercises regularly for years and should not suffer from this chronic pain  -Patient will get a muscle test and nerve conduction study  -Patient will start blood flow restriction therapy with David Vela at [place].  -Patient will follow up in approximately 4 weeks via phone for reevaluation and review of EMG nerve conduction study test results  -To consider second opinion with Dr. Guzman if no improvement  -Call direct clinic number [[phone]] at any time with questions or concerns.    Albert Yeo MD [facility]  [facility] [place]          -----    SUBJECTIVE:  Tyrone Whitfield is a 25 year old male who is seen in follow-up for left shoulder pain and right distal quad burning.They were last seen by Dr. Wolf on 1/14/21.     Since their last visit reports 0% - (About the same as last time). Shoulder seems to be more stable, states no change in the leg. They indicate that their current pain level is 3/10. They have tried ice, home exercises, physical therapy (3 visits), previous imaging (MRI 1/27/21) and foam rolling, and creams.      The patient is seen by themselves.    Patient's past medical, surgical, social, and family histories were reviewed today and no changes are noted.    REVIEW OF  "SYSTEMS:  Constitutional: NEGATIVE for fever, chills, change in weight  Skin: NEGATIVE for worrisome rashes, moles or lesions  GI/: NEGATIVE for bowel or bladder changes  Neuro: NEGATIVE for weakness, dizziness or paresthesias    OBJECTIVE:  /68   Ht 1.721 m (5' 7.75\")   Wt 77.1 kg (170 lb)   BMI 26.04 kg/m     General: healthy, alert and in no distress  HEENT: no scleral icterus or conjunctival erythema  Skin: no suspicious lesions or rash. No jaundice.  CV: regular rhythm by palpation, no pedal edema  Resp: normal respiratory effort without conversational dyspnea   Psych: normal mood and affect  Gait: normal steady gait with appropriate coordination and balance  Neuro: normal light touch sensory exam of the extremities.    MSK:  RIGHT KNEE  Inspection:    Normal alignment; no edema, erythema, or ecchymosis present  Palpation:    bony and ligamentous landmarks are nontender. Slightly decreased muscle mass compared to left quad    No effusion is present    Patellofemoral crepitus is Absent  Range of Motion:     00 extension to 1350 flexion  Strength:    Quadriceps grossly intact    Extensor mechanism intact  Special Tests:    Positive: none    Negative: MCL/valgus stress (0 & 30 deg), LCL/varus stress (0 & 30 deg), Lachman's, anterior drawer, posterior drawer, Margarita's    Independent visualization of the below image:  MRI Cervical spine w/o contrast [[id number]] Resulted: 01/27/21 1717   Order Status: Completed Updated: 01/27/21 1718   Narrative:     MRI OF THE CERVICAL SPINE WITHOUT CONTRAST 1/27/2021 4:07 PM     COMPARISON: Cervical spine x-rays 1/14/2021.     HISTORY: Access for disc herniation; Left cervical radiculopathy;   Chronic left shoulder pain.     TECHNIQUE: Multiplanar, multisequence MRI images of the cervical spine   were acquired without intravenous contrast.     FINDINGS: Alignment, vertebral body heights and disc heights are   normal. Marrow signal and spinal cord signal are normal. The " facets   articulate normally. The paraspinous soft tissues are unremarkable.   The cervicomedullary junction is patent. The spinal canal and neural   foramina are widely patent throughout the cervical spine.    Impression:     IMPRESSION: Normal cervical spine MRI.     TOMMY RUIZ MD     MR Knee Right w/o Contrast [[id number]] Resulted: 01/27/21 1624   Order Status: Completed Updated: 01/27/21 1625   Narrative:     MR right knee without contrast 1/27/2021 4:17 PM     Techniques: Multiplanar multisequence imaging of the right knee was   obtained without administration of intra-articular or intravenous   contrast using routing protocol.     History: access for quad tendinopathy; Chronic pain of right knee;   Chronic pain of right knee     Comparison: 1/14/2021     Findings:     MENISCI:   Medial meniscus: Intact.   Lateral meniscus: Intact.     LIGAMENTS   Cruciate ligaments: Intact.   Medial supporting structures: Intact.   Lateral supporting structures: Mild edema deep to the iliotibial band,   query iliotibial band friction syndrome. Otherwise, the lateral   supporting structures are intact     EXTENSOR MECHANISM   Intact.     FLUID   No joint effusion. No substantial Baker's cyst.     OSSEOUS and ARTICULAR STRUCTURES   Bones: No fracture, contusion, or osseous lesion is seen. Foci of   hypointensities in the distal femur, likely bone islands.     Patellofemoral compartment: Increased T2 signal lateral patellar facet   articular cartilage, query low-grade chondromalacia.     Medial compartment: Signal heterogeneity with superficial chondral   loss of the posterior weightbearing surface of medial femoral condyle.     Lateral compartment: Signal heterogeneity posterior weightbearing   surface of the lateral femoral condyle.     ANCILLARY FINDINGS   Mild muscle edema of the soleus, likely related to muscle strain.    Impression:     Impression:   1. No tear or tendinosis of the quadriceps tendon.   2. Query iliotibial  band friction syndrome.   3. 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 No   high-grade chondral loss.   4. Mild muscle edema of the soleus, likely related to muscle strain.     ISAEL MELANI         Patient's conditions were thoroughly discussed during today's visit with greater than 50% of the visit spent counseling the patient with total time spent face-to-face with the patient being 26 minutes.    Albert Yeo MD, [facility]            Again, thank you for allowing me to participate in the care of your patient.        Sincerely,        Albert Yeo, MD

## 2021-02-09 ENCOUNTER — THERAPY VISIT (OUTPATIENT)
Dept: PHYSICAL THERAPY | Facility: CLINIC | Age: 26
End: 2021-02-09
Payer: COMMERCIAL

## 2021-02-09 DIAGNOSIS — M79.10 MUSCLE PAIN: ICD-10-CM

## 2021-02-09 PROCEDURE — 97112 NEUROMUSCULAR REEDUCATION: CPT | Mod: GP | Performed by: PHYSICAL THERAPIST

## 2021-02-09 PROCEDURE — 97110 THERAPEUTIC EXERCISES: CPT | Mod: GP | Performed by: PHYSICAL THERAPIST

## 2021-02-09 NOTE — PROGRESS NOTES
[facility]: Physical Therapy Initial Evaluation   Feb 10, 2021    Subjective:   Chief Complaint: Right knee pain   Pain: just above the knee cap on the right side, more medially than laterally. Burning.   Numbness/Tingling: None   Weakness: None   Stiffness: Right leg generally more than the left  New/Recurrent/Chronic: Chronic  DOI/onset: about 1 year ago   Referral Date: 2/3/2021 - Yeo, Albert, MD  Mechanism of onset: leg press (single leg)  PMH/surgical history/trauma: No significant medical history reported ; chart indicates the following   - Tourette's Syndrome    - MDD  General health as reported by patient: Excellent   Medications: Anti-depressants, Anti-inflammatory, Pain, Steroids,    Occupation:  Job duties: lifting/carrying,   Previous Treatment (Effect): rest ; foam rolling (no help) ; scraping tool (helps for about a day or so) ; muscle stim (doesn't help) ; heat (best) ; ice (helps momentarily) ;   Imagin2021 - R Knee MRI Impression:  1. No tear or tendinosis of the quadriceps tendon.  2. Query iliotibial band friction syndrome.  3. 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 No high-grade chondral loss.  4. Mild muscle edema of the soleus, likely related to muscle strain.  AM/PM: only worse after working out  Quality of Pain: burning  Pain: 3/10 at present, 7/10 at worst  Worse: run, jump, squat, pushing on the spot  Better: see above  Progression of Symptoms since onset: just not getting better   Sleeping: No disturbance   Current Functional Status: SEE GOALS FLOWSHEET  - squatting - hurts while doing it after the first set, then even more the next day. Lasts for about 1 week.    - running - does okay if he can really warm it up.   - working - works as a , harder to effectively instruct/demonstrate.  Previous Functional Status: No restrictions  Current HEP/exercise regimen: weight lifting, running,   Transportation to  Therapy: Independent with transportation  Live with Others: Lives with girlfriend and her parents and brother, 1 dog,     Patient's goal(s): Would like to be able to lift weights again. Even to just be able to do body-weight squats without it hurting the following days.       Objective:    Standing Posture: WNL    Gait: WNL    SL Balance: WNL     Functional:   - Squat: WNL        AROM: (* indicates patient's pain)   PROM:(over pressure)   R  L R L   Hyperextension 7 5     Extension 0 0 WNL WNL   Flexion 150 149 WNL WNL       Special tests:   R L   Sweep Test (-)    Patellar Compression (-)      Palpation: Tenderness to palpation of a tight cord in the right vastus medialus    Other:  - Negative femoral nerve tension test    Patient was screened and reported none of the following conditions that would disqualify him for BFR treatment:  Precautions:  Poor circulation (indicators being shining/scaly skin, brittle/dry nails, extremity hair loss, varicose veins, capillary refill time, etc.)  Obese or with limb tissue that is loose  Patients taking antihypertensives, or creatine supplements  Arterial calcification  Abnormal clotting times  Diabetes  Sickle cell trait  Tumor  General infection  Hypertension  Cardiopulmonary conditions  Renal compromise  Clinically significant acid-base imbalance  Atherosclerotic vessels      Contraindications:  Venous thromboembolism   Impaired circulation or peripheral vascular compromise  Previous revascularization of the extremity  Extremities with dialysis access  Acidosis  Sickle cell anemia  Extremity infection  Tumor distal to the tourniquet  Medication and supplements known to increase clotting risk  Open fracture  Increased intracranial pressure  Open soft tissue injuries  Post-traumatic lengthy hand reconstructions  Severe crushing injuries  Severe hypertension  Skin grafts in which all bleeding points must be readily distinguished  Secondary or delayed procedures after  "immobilization  Vascular grafting  Lymphectomies  Cancer        Date: Feb 10, 2021    Blood Flow Restriction Training: Contraindications and precautions reviewed, pt safe for use of  modality, Risks and benefits discussed; pt gave informed consent  Lingering effects (from previous session): N/a  Target Limb: Right Lower Extremity  Limb Occlusion Pressure (LOP): 236 mmHg  Percent Occlusion (80% for LE, 50% for UE): 80 %  Personal Tourniquet Pressure (PTP): 188 mmHg  Tourniquet Size: Blue    SET #1   Exercises Performed Total time under tourniquet Immediate effects   Rate of Perceived Exertion   1 (30 reps) 30Lb Leg Press Single leg 2 min \"can feel the compression\" /10   2 (15 reps) 30Lb Leg Press Single leg 1 min Feels pretty good 4/10   3 (15 reps) 40Lb Leg Press Single leg 1 min Groin is a little sore (muscle soreness) 7/10   4 (15 reps) 30Lb Leg Press Single leg 1 min  Discomfort with the cuff, had to stop      NOTES:   - Went lighter than normal so as to more effectively monitor for aggravation of symptoms          Therapist Impression/Differential Diagnosis:   Don presents to physical therapy with a primary complaint of right knee pain in the region of the VMO, definitively superior to the patella. Patient demonstrates normal walking and squatting mechanics as well as normal range of motion in the knee with normal end-feels. He was negative with a patellar compression test and with a femoral nerve tension test. A thick and tender cord was palpated deep in the muscle of the vastus medialis. His orders included blood-flow restriction training which was initiated today. He may also benefit from dry needling in the future, but was not screened for it today and it was not discussed with the patient.       Assessment/Plan:    Patient is a 25 year old male with right side knee complaints.    Patient has the following significant findings with corresponding treatment plan.                Referring Diagnosis: Right leg " pain    Pain -  hot/cold therapy, manual therapy, splint/taping/bracing/orthotics, self management, education, home program and Dry needling  Decreased function - therapeutic activities and home program        Therapy Evaluation Codes:   1) History comprised of:   Personal factors that impact the plan of care:      Profession and Time since onset of symptoms.    Comorbidity factors that impact the plan of care are:      None.     Medications impacting care: Anti-inflammatory, Pain and Steroids.  2) Examination of Body Systems comprised of:   Body structures and functions that impact the plan of care:      Knee.   Activity limitations that impact the plan of care are:      Running, Squatting/kneeling and Working.  3) Clinical presentation characteristics are:   Evolving/Changing.  4) Decision-Making    Low complexity using standardized patient assessment instrument and/or measureable assessment of functional outcome.  Cumulative Therapy Evaluation is: Low complexity.    Previous and current functional limitations:  (See Goal Flow Sheet for this information)    Short term and Long term goals: (See Goal Flow Sheet for this information)     Communication ability:  Patient appears to be able to clearly communicate and understand verbal and written communication and follow directions correctly.  Treatment Explanation - The following has been discussed with the patient:   RX ordered/plan of care  Anticipated outcomes  Possible risks and side effects  This patient would benefit from PT intervention to resume normal activities.   Rehab potential is good.    Frequency:  2 X week, once daily  Duration:  for 6 weeks  Discharge Plan:  Achieve all LTG.  Independent in home treatment program.  Reach maximal therapeutic benefit.    Please refer to the daily flowsheet for treatment today, total treatment time and time spent performing 1:1 timed codes.

## 2021-02-10 ENCOUNTER — THERAPY VISIT (OUTPATIENT)
Dept: PHYSICAL THERAPY | Facility: CLINIC | Age: 26
End: 2021-02-10
Payer: COMMERCIAL

## 2021-02-10 DIAGNOSIS — M79.604 RIGHT LEG PAIN: ICD-10-CM

## 2021-02-10 PROCEDURE — 97161 PT EVAL LOW COMPLEX 20 MIN: CPT | Mod: GP | Performed by: PHYSICAL THERAPIST

## 2021-02-10 PROCEDURE — 97110 THERAPEUTIC EXERCISES: CPT | Mod: GP | Performed by: PHYSICAL THERAPIST

## 2021-02-10 ASSESSMENT — ACTIVITIES OF DAILY LIVING (ADL)
KNEE_ACTIVITY_OF_DAILY_LIVING_SCORE: 94.29
HOW_WOULD_YOU_RATE_THE_OVERALL_FUNCTION_OF_YOUR_KNEE_DURING_YOUR_USUAL_DAILY_ACTIVITIES?: NORMAL
SIT WITH YOUR KNEE BENT: ACTIVITY IS NOT DIFFICULT
WEAKNESS: I DO NOT HAVE THE SYMPTOM
RISE FROM A CHAIR: ACTIVITY IS NOT DIFFICULT
SWELLING: I DO NOT HAVE THE SYMPTOM
KNEEL ON THE FRONT OF YOUR KNEE: ACTIVITY IS NOT DIFFICULT
AS_A_RESULT_OF_YOUR_KNEE_INJURY,_HOW_WOULD_YOU_RATE_YOUR_CURRENT_LEVEL_OF_DAILY_ACTIVITY?: NEARLY NORMAL
STIFFNESS: I DO NOT HAVE THE SYMPTOM
PAIN: THE SYMPTOM AFFECTS MY ACTIVITY MODERATELY
STAND: ACTIVITY IS NOT DIFFICULT
GO DOWN STAIRS: ACTIVITY IS NOT DIFFICULT
SQUAT: ACTIVITY IS NOT DIFFICULT
GIVING WAY, BUCKLING OR SHIFTING OF KNEE: I DO NOT HAVE THE SYMPTOM
HOW_WOULD_YOU_RATE_THE_CURRENT_FUNCTION_OF_YOUR_KNEE_DURING_YOUR_USUAL_DAILY_ACTIVITIES_ON_A_SCALE_FROM_0_TO_100_WITH_100_BEING_YOUR_LEVEL_OF_KNEE_FUNCTION_PRIOR_TO_YOUR_INJURY_AND_0_BEING_THE_INABILITY_TO_PERFORM_ANY_OF_YOUR_USUAL_DAILY_ACTIVITIES?: 100
KNEE_ACTIVITY_OF_DAILY_LIVING_SUM: 66
RAW_SCORE: 66
WALK: ACTIVITY IS MINIMALLY DIFFICULT
GO UP STAIRS: ACTIVITY IS NOT DIFFICULT
LIMPING: I DO NOT HAVE THE SYMPTOM

## 2021-02-11 PROBLEM — M79.604 RIGHT LEG PAIN: Status: ACTIVE | Noted: 2021-02-11

## 2021-02-16 ENCOUNTER — THERAPY VISIT (OUTPATIENT)
Dept: PHYSICAL THERAPY | Facility: CLINIC | Age: 26
End: 2021-02-16
Payer: COMMERCIAL

## 2021-02-16 DIAGNOSIS — M79.10 MUSCLE PAIN: ICD-10-CM

## 2021-02-16 PROCEDURE — 97110 THERAPEUTIC EXERCISES: CPT | Mod: GP | Performed by: PHYSICAL THERAPIST

## 2021-02-16 NOTE — PROGRESS NOTES
Alexx is a 25 year old who is being evaluated via a billable video visit.      How would you like to obtain your AVS? MyChart  If the video visit is dropped, the invitation should be resent by: Text to cell phone: .[phone]    Will anyone else be joining your video visit? Faby Walker CMA   [facility]   Pain Management Center    [facility] Consultation    Date of visit: 2/16/2021    Reason for consultation:    Primary Care Provider is Bel Leach  Pain medications are being prescribed by SHELIA .    Please see the [facility] health questionnaire which the patient completed and reviewed with me in detail.    Tyrone Whitfield is a 25 year old male with a history of depression, tourette's disorder who I was asked to see in consultation by Mu Wolf for evaluation of right knee, bilateral hips, left shoulder pain    Chief Complaint:  Pain in right knee, left shoulder, bilateral hips     History of Present Illness:  Tyrone Whitfield is a 25 year old male with pain in multiple areas. He reports onset of pain about 5 years ago. No specific inciting event but he was working out a lot at that time and thinks this could have aggravated symptoms.     Pain started in the bilateral hips and is mostly located in the posterolateral aspect of the hips. This is aching and throbbing in quality. He reports having pain in the gluts and hamstrings. He has pain in the left shoulder and is noted to have some scapular dyskinesis. Reports having tingling into the left proximal arm and forearm. Has some chronic tension in the trapezius. Also has pain in the right quadriceps that is burning in quality. Pain overall is constant but varies in severity. Typically aggravated with exercise and inflammatory foods and poor sleep. He has some relief with massage, TENS unit, ice, topical medications    The patient otherwise denies bowel or bladder incontinence, weakness, saddle  anesthesia, unintentional weight loss, or fever/chills/sweats.     He is seeing sports medicine as well for shoulder and knee pain. He is actively participating in PT and is making some progress mainly with the shoulder pain. He has a NCS/EMG scheduled in the next 1-2 weeks.     Pain Information:   Onset/Progression:  Pain started 5 years ago   Pain quality: Aching, Burning, Throbbing and Tingling    Pain timing: Constant     Pain rating: intensity ranges from 2/10 to 8/10 on a 0-10 scale.   Aggravating factors include: poor sleep, inflammatory foods, exercise   Relieving factors include: creatine, massage, TENS unit     Current Pain Relevant Medications:    OTC topicals - [facility] (prefers a capsaicin type product)    Other Relevant Medications:    Wellbutrin    Previous Pain Relevant Medications: (H--helped; HI--Helped initially; [facility]--Somewhat helpful; [facility]--No help; W--worse; SE--side effects; ?--Unsure if helpful)   NOTE: This medication information taken from patient's intake form, not medical records.   Opiates: na  NSAIDS: ibuprofen, aleve - [facility]  Anti-migraine mediations: na  Muscle Relaxants: Has used one in the past but unsure of the name - [facility]  Neuropathics: na  Anti-depressants: na  Anxiolytics: na  Topicals: na  Sleep aids:na  Other medications not covered above: Tylenol - [facility], medrol dose avis - [facility], CBD oil - helped relax but [facility] for pain    Past Pain Treatments:   Pain Clinic:   No  PT: Yes - Currently doing PT and is making progress. Doing PT for shoulder as well as blood flow restriction therapy for the leg  Pain psychologist: No  Relaxation techniques/biofeedback: No  Chiropractor: Yes - [facility]  Acupuncture: No  TENs Unit:Yes - H  Injections: No  Self-care:   Yes - massage - [facility], ice - [facility]  Surgeries related to pain: No    Past Medical History:  Past Medical History:   Diagnosis Date     MDD (major depressive disorder)      Tourette's disorder      Past Surgical History:  Past Surgical History:   Procedure  Laterality Date     TONSILLECTOMY & ADENOIDECTOMY       Medications:  Current Outpatient Medications   Medication Sig Dispense Refill     buPROPion (WELLBUTRIN SR) 150 MG 12 hr tablet TK 1 T PO QAM       diazepam (VALIUM) 10 MG tablet Take 1 tablet (10 mg) by mouth once as needed (prior to MRI for Tourette's) Take 30 to 60 minutes prior to MRI. Do not drive after taking this medication. 1 tablet 0     methylPREDNISolone (MEDROL DOSEPAK) 4 MG tablet therapy pack Follow Package Directions 21 tablet 0     Multiple Vitamins-Minerals (MULTIVITAMIN & MINERAL PO) Take 1 tablet by mouth daily       Allergies:   No Known Allergies    Social History:  Home situation: Lives in [place], [place] with his girlfriend and her family  Occupation/Schooling:   Tobacco use: no  Alcohol use: weekly more recently  Drug use: no    Family history:  Family History   Adopted: Yes   Problem Relation Age of Onset     Lung Cancer Maternal Grandmother         smoker     Leukemia Paternal Grandfather      Hypertension Father      Cholesteatoma Father      Pacemaker Paternal Uncle      Psoriasis Mother      Diabetes No family hx of      Myocardial Infarction No family hx of      Cerebrovascular Disease No family hx of      Prostate Cancer No family hx of      Colon Cancer No family hx of      Review of Systems:  Gen: fatigue  Skin: negative  Eyes: negative  Ears/Nose/Throat: negative   Respiratory: No shortness of breath, dyspnea on exertion  Cardiovascular: negative  Gastrointestinal: negative  Genitourinary: negative  Musculoskeletal: shoulder pain, knee pain, hip pain  Neurologic: paresthesias  Psychiatric: negative  Hematologic/Lymphatic/Immunologic: negative  Endocrine: negative    Physical Exam:  Exam:  Constitutional: A&O. NAD. Well developed, well nourished, appears stated age.  HEENT: Head atraumatic, normocephalic.   Respiratory: No respiratory distress on RA .  Psychiatric/mental status: without lethargy or  stupor. Speech fluent. Appropriate affect. Mood normal. Able to follow commands without difficulty.     Musculoskeletal exam:  Gait/Station/Posture: Posture normal. Did not assess gait.      Lumbar spine:  Range of motion within normal limits in flexion and extension    Previous Diagnostic Tests:   Imaging Studies:   MRI of Cervical Spine completed on 1/27/2021 shows:  FINDINGS: Alignment, vertebral body heights and disc heights are  normal. Marrow signal and spinal cord signal are normal. The facets  articulate normally. The paraspinous soft tissues are unremarkable.  The cervicomedullary junction is patent. The spinal canal and neural  foramina are widely patent throughout the cervical spine.                                                                      IMPRESSION: Normal cervical spine MRI.    MRI of Right Knee completed on 1/27/2021 shows:  Findings:     MENISCI:  Medial meniscus: Intact.  Lateral meniscus: Intact.     LIGAMENTS  Cruciate ligaments: Intact.  Medial supporting structures: Intact.  Lateral supporting structures: Mild edema deep to the iliotibial band,  query iliotibial band friction syndrome. Otherwise, the lateral  supporting structures are intact     EXTENSOR MECHANISM  Intact.     FLUID  No joint effusion. No substantial Baker's cyst.     OSSEOUS and ARTICULAR STRUCTURES  Bones: No fracture, contusion, or osseous lesion is seen. Foci of  hypointensities in the distal femur, likely bone islands.     Patellofemoral compartment: Increased T2 signal lateral patellar facet  articular cartilage, query low-grade chondromalacia.     Medial compartment: Signal heterogeneity with superficial chondral  loss of the posterior weightbearing surface of medial femoral condyle.     Lateral compartment: Signal heterogeneity posterior weightbearing  surface of the lateral femoral condyle.     ANCILLARY FINDINGS  Mild muscle edema of the soleus, likely related to muscle strain.                                                                       Impression:  1. No tear or tendinosis of the quadriceps tendon.  2. Query iliotibial band friction syndrome.  3. 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 No  high-grade chondral loss.  4. Mild muscle edema of the soleus, likely related to muscle strain.    MR Pelvis completed on 8/19/2015 showed:  Findings:     The visualized lower lumbar spine is unremarkable. No abnormal marrow  signal within the visualized pelvis to suggest fracture or  osteonecrosis. No stress changes.     No significant fluid in either sacroiliac joint. No abnormal  enhancement to suggest synovitis. No erosive changes.     The muscle bulk is symmetric without significant atrophy or edema.     IMPRESSION  Impression: No findings to suggest sacroiliitis. No findings in the  visualized pelvis to explain patient's back pain.    Other Testing (labs, diagnostics) reviewed:   Labs: creatinine 1.18  EKG: NA    [place] Board of Pharmacy Data Base Reviewed:    YES; No concern for abuse or misuse of controlled medications based on this report.     ASSESSMENT:   Tyrone Whitfield is a 25 year old male who presents today with the complaints of:    1. Myofascial pain: Reports pain in left shoulder, right knee and posterolateral hips. Pain has been present for about 5 years and is typically aggravated with exercise. MRI of cervical spine was completed to evaluate left arm symptoms and this did not show any acute pathology. He has started PT and is noticing some improvement with the shoulder pain. He is doing blood flow restriction therapy for the right leg pain.     Plan:  The following recommendations were given to the patient. Diagnosis, treatment options, risks, benefits, and alternatives were discussed, and all questions were answered. The patient expressed understanding of the plan for management.     I am recommending a multidisciplinary treatment plan to help this patient better manage his  pain.  This includes:     Additional Workup:   1. Diagnostic Studies: He has a NCS/EMG scheduled in 1-2 weeks.   2. Laboratory studies: None  Therapies:   3. Physical Therapy: Continue working with PT for shoulder pain as well as continuing blood flow restriction therapy for the right leg pain.   4. Clinical Health Psychologist to address issues of relaxation, behavioral change, coping style, and other factors important to improvement: not at this time  Medication Management:   5. Start methocarbamol 500 mg QID prn.   6. Continue utilizing topical medications as long as they are providing some benefit  7. Consider addition of Cymbalta in the future for chronic musculoskeletal pain if there is no improvement in pain with therapies.   Other:   8. Recommend trial of acupuncture to see if it provides some benefit.   Further procedures recommended:   9. Consider TPI. I would need to do an in person exam to see if this would be appropriate to try.   10. I would need to do an exam before making any other procedure recommendations.     Follow up: 4-6 weeks for an in person visit.     Gianna Allred MD   [facility]     Video Start Time: 9:06 am  Video-Visit Details    Type of service:  Video Visit    Video End Time:9:40 am    Originating Location (pt. Location): Other work    Distant Location (provider location):   [facility] [place]     Platform used for Video Visit: [employer]    BILLING TIME DOCUMENTATION:   The total TIME spent on this patient on the date of the encounter/appointment was 45 minutes.      TOTAL TIME includes:   Time spent preparing to see the patient (reviewing records and tests)   Time spent face to face (or over the phone) with the patient  Time spent ordering tests, medications, procedures and referrals   Time spent documenting clinical information in Epic

## 2021-02-17 ENCOUNTER — VIRTUAL VISIT (OUTPATIENT)
Dept: PALLIATIVE MEDICINE | Facility: CLINIC | Age: 26
End: 2021-02-17
Payer: COMMERCIAL

## 2021-02-17 ENCOUNTER — THERAPY VISIT (OUTPATIENT)
Dept: PHYSICAL THERAPY | Facility: CLINIC | Age: 26
End: 2021-02-17
Payer: COMMERCIAL

## 2021-02-17 DIAGNOSIS — M79.18 MYOFASCIAL PAIN: Primary | ICD-10-CM

## 2021-02-17 DIAGNOSIS — M79.604 RIGHT LEG PAIN: ICD-10-CM

## 2021-02-17 PROCEDURE — 97110 THERAPEUTIC EXERCISES: CPT | Mod: GP | Performed by: PHYSICAL THERAPIST

## 2021-02-17 PROCEDURE — 99204 OFFICE O/P NEW MOD 45 MIN: CPT | Mod: 95 | Performed by: PHYSICAL MEDICINE & REHABILITATION

## 2021-02-17 RX ORDER — METHOCARBAMOL 500 MG/1
500 TABLET, FILM COATED ORAL 4 TIMES DAILY PRN
Qty: 120 TABLET | Refills: 1 | Status: SHIPPED | OUTPATIENT
Start: 2021-02-17 | End: 2022-05-26

## 2021-02-17 NOTE — PROGRESS NOTES
Date: Feb 17, 2021    Blood Flow Restriction Training: Contraindications and precautions reviewed, pt safe for use of  modality, Risks and benefits discussed; pt gave informed consent  Lingering effects (from previous session): no soreness to speak of  Target Limb: Right Lower Extremity  Limb Occlusion Pressure (LOP): 204 mmHg  Percent Occlusion (80% for LE, 50% for UE): 80 %  Personal Tourniquet Pressure (PTP): 163 mmHg  Tourniquet Size: Blue    SET #1   Exercises Performed Total time under tourniquet Immediate effects   Rate of Perceived Exertion   1 (30 reps) SLR AG 2 min Tough 9/10   2 (15 reps) SLR AG 1 min Started abducting (suspect TFL recruitment 9/10   3 (15 reps) SAQ 1 min Really tight 5/10   4 (15 reps) SAQ 1 min  Toes feel a little numb 7/10       SET #2   Exercises Performed Total time under tourniquet Immediate effects   Rate of Perceived Exertion   1 (30 reps) SL Leg Press 30 LBs 2 min  5/10   2 (15 reps) SL Leg Press 30 LBs 1 min  5/10   3 (15 reps) SL Leg Press 30 LBs 1 min  6/10   4 (15 reps) SL Leg Press 40 LBs 1 min   6/10     SET #3   Exercises Performed Total time under tourniquet Immediate effects   Rate of Perceived Exertion   1 (30 reps) Halftaggered squat with plinth atmid-thigh 2 min  A bit too easy   2 (15 reps) Full staggered squat with plinth atmid-thigh 1 min  6/10   3 (15 reps) Full staggered squat with plinth at lower than mid-thigh 1 min  6/10   4 (15 reps) Full staggered squat with plinth just above the knee 1 min   7/10

## 2021-02-17 NOTE — PATIENT INSTRUCTIONS
Thank you for coming to [facility] for your care. It is my goal to partner with you to help you reach your optimal state of health.     As discussed during your visit these are the recommendations:    1. Medications:   - Start methocarbamol 500 mg four times daily as needed. Can be sedating.  Do not drive until you know how the medication affects you.  - You can continue utilizing over the counter topical medications if helpful  2. Therapies: Continue working with physical therapy.   3. Procedures: Trigger point injections can potentially be helpful. We can consider trying these in the future. I will need to see you to complete an exam before making any other injection recommendations.   4. Imaging/Diagnostic Studies: None at this time.   5. Acupuncture can be a great option to try. Here is some information.     Outside of [facility]:  While we don't have specific Acupuncturists names or clinics to share with you, we want to offer you some tips in finding an Acupuncturist.      ? In order to best find an Acupuncturist that is a fit for you, we recommend starting with this website:  [facility] - Find a practitioner  ? We recommend cross-referencing that list with your insurance plan to ensure you have coverage, if that is important to you, and with the list for the location that is a fit for you.   Additionally, we recommend that you ask a trusted friend, family member or colleague if they have utilized an Acupuncturist in your area that they would recommend.  However, keep in mind that just like finding a primary care     6. Follow up: 4-6 weeks for an in person visit.     Take care,    Gianna Allred MD  [facility]     ----------------------------------------------------------------  Clinic Number:  [phone]     Call with any questions about your care and for scheduling assistance.     Calls are returned Monday through Friday between 8 AM and 4:30 PM. We  usually get back to you within 2 business days depending on the issue/request.    If we are prescribing your medications:    For opioid medication refills, call the clinic or send a GetSocialt message 7 days in advance.  Please include:    Name of requested medication    Name of the pharmacy.    For non-opioid medications, call your pharmacy directly to request a refill. Please allow 3-4 days to be processed.     Per [place] State Law:    All controlled substance prescriptions must be filled within 30 days of being written.      For those controlled substances allowing refills, pickup must occur within 30 days of last fill.      We believe regular attendance is key to your success in our program!      Any time you are unable to keep your appointment we ask that you call us at least 24 hours in advance to cancel.This will allow us to offer the appointment time to another patient.     Multiple missed appointments may lead to dismissal from the clinic.

## 2021-02-19 ENCOUNTER — THERAPY VISIT (OUTPATIENT)
Dept: PHYSICAL THERAPY | Facility: CLINIC | Age: 26
End: 2021-02-19
Payer: COMMERCIAL

## 2021-02-19 DIAGNOSIS — M79.604 RIGHT LEG PAIN: ICD-10-CM

## 2021-02-19 PROCEDURE — 97110 THERAPEUTIC EXERCISES: CPT | Mod: GP | Performed by: PHYSICAL THERAPIST

## 2021-02-19 NOTE — PROGRESS NOTES
Date  2/19/2020 Limb Occlusion Pressure:  251 Percent (%) Occlusion  80% Training Occlusion Pressure  200 Exercises Performed  1) SLR  Set 1: Supine x30  PRE = 9/10  Set 2: Supine x10  PRE = 10/10  SetStanding x30, PRE = 8/10  Standing x30,  PRE = 8/10    2) SL Leg Press  Set 1: 30 reps, 3 pl, PRE = 6/10  Set 2: 15 reps, 3 pl, PRE = 7/10  Set 3: 15 reps, 2 pl, PRE = 9/10  Set 4:  15 reps, 4 pl, *switched to double leg    3) BOSU Squat  Set 1: 30 reps, RPE = 6/10  Set 2: 15 reps, RPE = 6/10  Set 3: 15 reps, RPE = 6/10  Set 4:  15 reps, RPE = 6/10   Total time under tourniquet  21   Immediate effects  Fatigue, muscle discomfort Lingering effects (from previous session)  Muscle soreness     NOTES: tolerated treatment well with mile to moderate discomfort in the right vastus medialis    Blood Flow Restriction Training: Contraindications and precautions reviewed, pt safe for use of  modality, Risks and benefits discussed; pt gave informed consent

## 2021-02-23 ENCOUNTER — THERAPY VISIT (OUTPATIENT)
Dept: PHYSICAL THERAPY | Facility: CLINIC | Age: 26
End: 2021-02-23
Payer: COMMERCIAL

## 2021-02-23 DIAGNOSIS — M79.604 RIGHT LEG PAIN: ICD-10-CM

## 2021-02-23 PROCEDURE — 97110 THERAPEUTIC EXERCISES: CPT | Mod: GP | Performed by: PHYSICAL THERAPIST

## 2021-02-23 NOTE — PROGRESS NOTES
"  Date: Feb 23, 2021    Blood Flow Restriction Training: Contraindications and precautions reviewed, pt safe for use of  modality, Risks and benefits discussed; pt gave informed consent  Lingering effects (from previous session): none  Target Limb: Right  Lower Extremity  Limb Occlusion Pressure (LOP): 200 mmHg  Percent Occlusion (80% for LE, 50% for UE): 80 %  Personal Tourniquet Pressure (PTP): 160 mmHg  Tourniquet Size: Blue    SET #1   Exercises Performed Total time under tourniquet Immediate effects   Rate of Perceived Exertion   1 (30 reps) R SLR flexion 2 min ok 7/10   2 (15 reps) R SLR  1 min tired 7/10   3 (14 reps) R SLR  1 min Too fatigued to cont 10/10   4 (15 reps)           SET #2   Exercises Performed Total time under tourniquet Immediate effects   Rate of Perceived Exertion   1 (30 reps) SL Leg Press 3 pl 2 min  5/10   2 (15 reps) SL Leg Press 3 pl 1 min  6/10   3 (15 reps) SL Leg Press 3 pl 1 min Ready for a little more weight 7/10   4 (15 reps) SL Leg Press 3.5 pl 1 min   8/10       SET #   Exercises Performed Total time under tourniquet Immediate effects   Rate of Perceived Exertion   1 (30 reps) Chair squats 2 min  6/10   2 (15 reps) Chair squats 1 min  6/10   3 (15 reps) Chair squats 1 min  7/10   4 (15 reps) Chair squats ~3\" stagger onto the RLE 1 min   8/10       DRY NEEDLING SCREEN:  Contraindications:  (Uncontrolled) anticoagulant use  Compromised Immune System  Local infection or active tumor  History of Lymph Node Removal (without clearance from oncologist)  Over/Near implanted device  Recent Surgery (6 weeks for region ; 12 weeks for specific site)  Autoimmune Disease  Respiratory Illness    Precautions:  Needle aversion/phobia - maybe a little  Cognitive Impairment  Communication Barrier  Severe Hyperalgesia/Allodynia  Metal allergy (usually nickel)  Abnormal bleeding tendency  Vascular Disease  Breast or other implant  Area of Laminectomy   Scoliosis  Osteoporosis (Severe)      "

## 2021-02-25 ENCOUNTER — TRANSFERRED RECORDS (OUTPATIENT)
Dept: HEALTH INFORMATION MANAGEMENT | Facility: CLINIC | Age: 26
End: 2021-02-25

## 2021-02-26 ENCOUNTER — THERAPY VISIT (OUTPATIENT)
Dept: PHYSICAL THERAPY | Facility: CLINIC | Age: 26
End: 2021-02-26
Payer: COMMERCIAL

## 2021-02-26 DIAGNOSIS — M79.604 RIGHT LEG PAIN: ICD-10-CM

## 2021-02-26 PROCEDURE — 97110 THERAPEUTIC EXERCISES: CPT | Mod: GP | Performed by: PHYSICAL THERAPIST

## 2021-02-26 NOTE — PROGRESS NOTES
Date: Feb 26, 2021    Blood Flow Restriction Training: Contraindications and precautions reviewed, pt safe for use of  modality, Risks and benefits discussed; pt gave informed consent  Lingering effects (from previous session): some aching  Target Limb: Right Lower Extremity  Limb Occlusion Pressure (LOP): 216 mmHg  Percent Occlusion (80% for LE, 50% for UE): 80 %  Personal Tourniquet Pressure (PTP): 174 mmHg  Tourniquet Size: Blue    SET #1   Exercises Performed Total time under tourniquet Immediate effects   Rate of Perceived Exertion   1 (30 reps) SLR flexion AG 2 min  7/10   2 (15 reps) SLR flexion AG 1 min  7/10   3 (15 reps) SLR flexion AG 1 min  9.5/10   4 (15 reps) SLR flexion AG 1 min  Very difficult 10/10       SET #2   Exercises Performed Total time under tourniquet Immediate effects   Rate of Perceived Exertion   1 (30 reps) SL Leg Press 3.5 pl 2 min  7/10   2 (15 reps) SL Leg Press 3.5 pl 1 min  7/10   3 (15 reps) SL Leg Press 3.5 pl 1 min Just some pain 8-9/10   4 (15 reps) SL Leg Press 3.5 pl 1 min   9/10     NOTES:

## 2021-03-03 ENCOUNTER — THERAPY VISIT (OUTPATIENT)
Dept: PHYSICAL THERAPY | Facility: CLINIC | Age: 26
End: 2021-03-03
Payer: COMMERCIAL

## 2021-03-03 DIAGNOSIS — M79.604 RIGHT LEG PAIN: ICD-10-CM

## 2021-03-03 PROCEDURE — 97110 THERAPEUTIC EXERCISES: CPT | Mod: GP | Performed by: PHYSICAL THERAPIST

## 2021-03-05 ENCOUNTER — THERAPY VISIT (OUTPATIENT)
Dept: PHYSICAL THERAPY | Facility: CLINIC | Age: 26
End: 2021-03-05
Payer: COMMERCIAL

## 2021-03-05 DIAGNOSIS — M79.604 RIGHT LEG PAIN: ICD-10-CM

## 2021-03-05 PROCEDURE — 97110 THERAPEUTIC EXERCISES: CPT | Mod: GP | Performed by: PHYSICAL THERAPIST

## 2021-03-05 PROCEDURE — 99207 PR STAT DRY NEEDLING - IAM: CPT | Mod: 25 | Performed by: PHYSICAL THERAPIST

## 2021-03-05 NOTE — PROGRESS NOTES
Dry Needling:   Target Tissue Needle Length Duration Stim? (Y/N) Response   L VMO 60mm x2 3 min x2 N Decreased pain, decreased tension

## 2021-03-16 ENCOUNTER — THERAPY VISIT (OUTPATIENT)
Dept: PHYSICAL THERAPY | Facility: CLINIC | Age: 26
End: 2021-03-16
Payer: COMMERCIAL

## 2021-03-16 DIAGNOSIS — M79.10 MUSCLE PAIN: ICD-10-CM

## 2021-03-16 PROCEDURE — 97530 THERAPEUTIC ACTIVITIES: CPT | Mod: GP | Performed by: PHYSICAL THERAPIST

## 2021-03-16 PROCEDURE — 97110 THERAPEUTIC EXERCISES: CPT | Mod: GP | Performed by: PHYSICAL THERAPIST

## 2021-03-16 PROCEDURE — 97112 NEUROMUSCULAR REEDUCATION: CPT | Mod: GP | Performed by: PHYSICAL THERAPIST

## 2021-03-16 NOTE — PROGRESS NOTES
PROGRESS  REPORT    Progress reporting period is from 1/8/2021 to 3/16/2021.       SUBJECTIVE  Subjective changes noted by patient:  .  Subjective: Pt continues to report shoulder pain.  it is no better/no worse since last appt a month ago.  He did lift weights yesterday and did not have pain during activity.  Pt complains of L shoulder feeling pressure and tight today.  Pain is in L rhomboid/periscap region and points to pain in L bicep.  Pt reports no pattern.  Some days feel better, some days feels worse and unsure why.  Pt is trying acupuncuture.  Getting dry needling in his quad since blood pj restriction was not helpful.  Asked about getting dry needling in his shoulder.      Current pain level is  Current Pain level: 3/10.     Previous pain level was   Initial Pain level: 6/10.   Changes in function:  Yes (See Goal flowsheet attached for changes in current functional level)  Adverse reaction to treatment or activity: None    OBJECTIVE  Changes noted in objective findings:    Objective: presents with full shoulder AROM all directions.  PROM full and painfree.  MMT L shoulder 5/5 flex, abd, ER and IR.  Still has winging of L shoulder in weight bearing positions.  Needs cues for scap protraction with mirror     ASSESSMENT/PLAN  Updated problem list and treatment plan: Diagnosis 1:  L scapular/teres pain  Pain -  manual therapy, self management, education and home program  Decreased strength - therapeutic exercise, therapeutic activities and home program  Decreased proprioception - neuro re-education, therapeutic activities and home program  Impaired posture - neuro re-education and therapeutic activities  Instability -  Therapeutic Activity  Therapeutic Exercise  Neuromuscular Re-education  Splinting/Taping/Bracing/Orthotic  STG/LTGs have been met or progress has been made towards goals:  Yes (See Goal flow sheet completed today.)  Assessment of Progress: The patient's progress has plateaued.  Self Management  Plans:  Patient has been instructed in a home treatment program.  Patient is independent in a home treatment program.  Patient  has been instructed in self management of symptoms.  Patient is independent in self management of symptoms.  I have re-evaluated this patient and find that the nature, scope, duration and intensity of the therapy is appropriate for the medical condition of the patient.  Tyrone continues to require the following intervention to meet STG and LTG's:  PT    Recommendations:  This patient would benefit from continued therapy.   Progress is slow and plateauing at this time.  Will work on HEP on own and return for strength progression.  Frequency:  1 X a month, once daily  Duration:  for 2 months        Please refer to the daily flowsheet for treatment today, total treatment time and time spent performing 1:1 timed codes.

## 2021-03-17 ENCOUNTER — THERAPY VISIT (OUTPATIENT)
Dept: PHYSICAL THERAPY | Facility: CLINIC | Age: 26
End: 2021-03-17
Payer: COMMERCIAL

## 2021-03-17 DIAGNOSIS — M79.604 RIGHT LEG PAIN: ICD-10-CM

## 2021-03-17 PROCEDURE — 97110 THERAPEUTIC EXERCISES: CPT | Mod: GP | Performed by: PHYSICAL THERAPIST

## 2021-03-17 PROCEDURE — 99207 PR STAT DRY NEEDLING - IAM: CPT | Mod: 25 | Performed by: PHYSICAL THERAPIST

## 2021-03-17 PROCEDURE — 97014 ELECTRIC STIMULATION THERAPY: CPT | Mod: GP | Performed by: PHYSICAL THERAPIST

## 2021-03-17 NOTE — PROGRESS NOTES
Dry Needling:   Target Tissue Needle Length Duration Stim? (Y/N) Response   R VMO 50mmx2 6 min N Decreased pain   L infraspinatus/teres minor 50mm x2 3 min Y Decreased tension

## 2021-03-24 ENCOUNTER — THERAPY VISIT (OUTPATIENT)
Dept: PHYSICAL THERAPY | Facility: CLINIC | Age: 26
End: 2021-03-24
Payer: COMMERCIAL

## 2021-03-24 DIAGNOSIS — M79.604 RIGHT LEG PAIN: ICD-10-CM

## 2021-03-24 PROCEDURE — 99207 PR STAT DRY NEEDLING - IAM: CPT | Mod: 25 | Performed by: PHYSICAL THERAPIST

## 2021-03-24 PROCEDURE — 97014 ELECTRIC STIMULATION THERAPY: CPT | Mod: GP | Performed by: PHYSICAL THERAPIST

## 2021-03-24 ASSESSMENT — ACTIVITIES OF DAILY LIVING (ADL)
PAIN: THE SYMPTOM AFFECTS MY ACTIVITY SEVERELY
STIFFNESS: I DO NOT HAVE THE SYMPTOM
HOW_WOULD_YOU_RATE_THE_OVERALL_FUNCTION_OF_YOUR_KNEE_DURING_YOUR_USUAL_DAILY_ACTIVITIES?: NEARLY NORMAL
SWELLING: I DO NOT HAVE THE SYMPTOM
GO UP STAIRS: ACTIVITY IS NOT DIFFICULT
KNEE_ACTIVITY_OF_DAILY_LIVING_SUM: 66
GO DOWN STAIRS: ACTIVITY IS NOT DIFFICULT
SQUAT: ACTIVITY IS NOT DIFFICULT
KNEEL ON THE FRONT OF YOUR KNEE: ACTIVITY IS NOT DIFFICULT
AS_A_RESULT_OF_YOUR_KNEE_INJURY,_HOW_WOULD_YOU_RATE_YOUR_CURRENT_LEVEL_OF_DAILY_ACTIVITY?: ABNORMAL
WEAKNESS: I DO NOT HAVE THE SYMPTOM
STAND: ACTIVITY IS NOT DIFFICULT
WALK: ACTIVITY IS NOT DIFFICULT
SIT WITH YOUR KNEE BENT: ACTIVITY IS NOT DIFFICULT
HOW_WOULD_YOU_RATE_THE_CURRENT_FUNCTION_OF_YOUR_KNEE_DURING_YOUR_USUAL_DAILY_ACTIVITIES_ON_A_SCALE_FROM_0_TO_100_WITH_100_BEING_YOUR_LEVEL_OF_KNEE_FUNCTION_PRIOR_TO_YOUR_INJURY_AND_0_BEING_THE_INABILITY_TO_PERFORM_ANY_OF_YOUR_USUAL_DAILY_ACTIVITIES?: 50
GIVING WAY, BUCKLING OR SHIFTING OF KNEE: I DO NOT HAVE THE SYMPTOM
LIMPING: I DO NOT HAVE THE SYMPTOM
RISE FROM A CHAIR: ACTIVITY IS NOT DIFFICULT
KNEE_ACTIVITY_OF_DAILY_LIVING_SCORE: 94.29
RAW_SCORE: 66

## 2021-03-24 NOTE — Clinical Note
NADEGE,    Recommended Don follow up with you again. He did not have any significant deficits for PT address/ We did 6 sessions of blood-flow restriction training, and a few of dry needling. No significant changes with either, only short-lived improvement with dry needling.     --David

## 2021-03-24 NOTE — PROGRESS NOTES
DISCHARGE  REPORT    Progress reporting period is from Feb 10, 2021 to Mar 24, 2021.         SUBJECTIVE  Subjective changes noted by patient: Reports that he gets some relief with dry needling for the day.     Current Pain level: 5/10.    Initial Pain level: 7/10.   Changes in function:  None  Adverse reaction to treatment or activity: activity - running and squatting in crease the pain.     OBJECTIVE  Changes noted in objective findings:  None    Patient continues to have just a region of tissue in and around the VMO.     Dry Needling:   Target Tissue Needle Length Duration Stim? (Y/N) Response   R VMO 60mm x2 5 min Y Decreased tension, decreased pain       ASSESSMENT/PLAN  Updated problem list and treatment plan: Diagnosis 1:  Right leg pain  STG/LTGs have been met or progress has been made towards goals:  None  Assessment of Progress: The patient's condition is unchanged.  Self Management Plans:  Physical therapy has been ineffective in altering symptoms  I have re-evaluated this patient and find that the nature, scope, duration and intensity of the therapy is appropriate for the medical condition of the patient.  Tyrone continues to require the following intervention to meet STG and LTG's:  PT intervention is no longer required to meet STG/LTG.    Recommendations:  This patient would benefit from further evaluation.  Patient has no deficits that PT could address with a home program, and blood-flow restriction training and dry needling have been ineffective.     Please refer to the daily flowsheet for treatment today, total treatment time and time spent performing 1:1 timed codes.

## 2021-03-31 ENCOUNTER — OFFICE VISIT (OUTPATIENT)
Dept: ORTHOPEDICS | Facility: CLINIC | Age: 26
End: 2021-03-31
Payer: COMMERCIAL

## 2021-03-31 VITALS
DIASTOLIC BLOOD PRESSURE: 80 MMHG | WEIGHT: 170 LBS | HEIGHT: 68 IN | SYSTOLIC BLOOD PRESSURE: 118 MMHG | BODY MASS INDEX: 25.76 KG/M2

## 2021-03-31 DIAGNOSIS — M79.604 RIGHT LEG PAIN: Primary | ICD-10-CM

## 2021-03-31 DIAGNOSIS — G89.29 OTHER CHRONIC PAIN: ICD-10-CM

## 2021-03-31 PROCEDURE — 99213 OFFICE O/P EST LOW 20 MIN: CPT | Performed by: FAMILY MEDICINE

## 2021-03-31 ASSESSMENT — MIFFLIN-ST. JEOR: SCORE: 1726.64

## 2021-03-31 NOTE — LETTER
3/31/2021         RE: Tyrone Whitfield  [id number] [address] [address]        Dear Colleague,    Thank you for referring your patient, Tyrone Whitfield, to the [facility] [place]. Please see a copy of my visit note below.    ASSESSMENT & PLAN  Patient Instructions     1. Right leg pain    2. Other chronic pain      -Patient is following up for chronic right leg pain due to an unknown cause.  Patient is also reporting various joint pains with ingestion of certain foods as well.  -Patient had an EMG and nerve conduction study which was normal.  Patient was unable to activate his peroneal longus muscle due to pain elicited during the procedure.  However this does not correlate with his clinical symptoms and is not of clinical significance  -Patient has not improved with physical therapy including blood flow restriction treatment.  -Patient had a virtual visit with pain management.  I recommend that he follow-up with pain management for an in person evaluation and further treatments such as trigger point injections to help ease his chronic musculoskeletal pains.  -I do not have any answers as to the cause of his pain at this time.  -I recommend patient see a functional/integrative medicine specialist to help determine the cause of his food related pain flares and find the appropriate diet for him to follow to control his symptoms.  Patient may follow up with Dr. Megan Young.  He may call [phone]  -If he does not get relief from the Functional Medicine doctor, I recommend that he see a neurologist to rule out any nerve disorders that may be causing his chronic pain  -If the neurologist cannot find any abnormalities, recommend he see a rheumatologist to evaluate for inflammatory conditions  -Call direct clinic number [[phone]] at any time with questions or concerns.    Albert Yeo MD [facility]  [facility]  "Center          -----    SUBJECTIVE:  Tyrone Whitfield is a 25 year old male who is seen in follow-up for chronic right lower quadricep pain due to an unknown etiology. They were last seen 2/3/2021.     Since their last visit reports 0% - (About the same as last time). They indicate that their current pain level is 3/10. They have tried other medications: Prednisone (Medrol), home exercises, physical therapy (6 visits) and previous imaging (Other EMG on 2/25/21).      The patient is seen by themselves.    Patient's past medical, surgical, social, and family histories were reviewed today and no changes are noted.    REVIEW OF SYSTEMS:  Constitutional: NEGATIVE for fever, chills, change in weight  Skin: NEGATIVE for worrisome rashes, moles or lesions  GI/: NEGATIVE for bowel or bladder changes  Neuro: NEGATIVE for weakness, dizziness or paresthesias    OBJECTIVE:  /80   Ht 1.721 m (5' 7.75\")   Wt 77.1 kg (170 lb)   BMI 26.04 kg/m     General: healthy, alert and in no distress  HEENT: no scleral icterus or conjunctival erythema  Skin: no suspicious lesions or rash. No jaundice.  CV: regular rhythm by palpation, no pedal edema  Resp: normal respiratory effort without conversational dyspnea   Psych: normal mood and affect  Gait: normal steady gait with appropriate coordination and balance  Neuro: normal light touch sensory exam of the extremities.    MSK:  RIGHT KNEE  Inspection:    Normal alignment; no edema, erythema, or ecchymosis present  Palpation:    bony and ligamentous landmarks are nontender. Slightly decreased muscle mass compared to left quad    No effusion is present    Patellofemoral crepitus is Absent  Range of Motion:     00 extension to 1350 flexion  Strength:    Quadriceps grossly intact    Extensor mechanism intact  Special Tests:    Positive: none    Negative: MCL/valgus stress (0 & 30 deg), LCL/varus stress (0 & 30 deg), Lachman's, anterior drawer, posterior drawer, " Margarita's       Independent visualization of the below image:    Patient's conditions were thoroughly discussed during today's visit with greater than 50% of the visit spent counseling the patient with total time spent face-to-face with the patient being 26 minutes.    Albert Yeo MD, [facility]            Again, thank you for allowing me to participate in the care of your patient.        Sincerely,        Albert Yeo, MD

## 2021-03-31 NOTE — PATIENT INSTRUCTIONS
1. Right leg pain    2. Other chronic pain      -Patient is following up for chronic right leg pain due to an unknown cause.  Patient is also reporting various joint pains with ingestion of certain foods as well.  -Patient had an EMG and nerve conduction study which was normal.  Patient was unable to activate his peroneal longus muscle due to pain elicited during the procedure.  However this does not correlate with his clinical symptoms and is not of clinical significance  -Patient has not improved with physical therapy including blood flow restriction treatment.  -Patient had a virtual visit with pain management.  I recommend that he follow-up with pain management for an in person evaluation and further treatments such as trigger point injections to help ease his chronic musculoskeletal pains.  -I do not have any answers as to the cause of his pain at this time.  -I recommend patient see a functional/integrative medicine specialist to help determine the cause of his food related pain flares and find the appropriate diet for him to follow to control his symptoms.  Patient may follow up with Dr. Megan Young.  He may call [phone]  -If he does not get relief from the Functional Medicine doctor, I recommend that he see a neurologist to rule out any nerve disorders that may be causing his chronic pain  -If the neurologist cannot find any abnormalities, recommend he see a rheumatologist to evaluate for inflammatory conditions  -Call direct clinic number [[phone]] at any time with questions or concerns.    Albert Yeo MD [facility]  [facility] [place]

## 2021-03-31 NOTE — PROGRESS NOTES
ASSESSMENT & PLAN  Patient Instructions     1. Right leg pain    2. Other chronic pain      -Patient is following up for chronic right leg pain due to an unknown cause.  Patient is also reporting various joint pains with ingestion of certain foods as well.  -Patient had an EMG and nerve conduction study which was normal.  Patient was unable to activate his peroneal longus muscle due to pain elicited during the procedure.  However this does not correlate with his clinical symptoms and is not of clinical significance  -Patient has not improved with physical therapy including blood flow restriction treatment.  -Patient had a virtual visit with pain management.  I recommend that he follow-up with pain management for an in person evaluation and further treatments such as trigger point injections to help ease his chronic musculoskeletal pains.  -I do not have any answers as to the cause of his pain at this time.  -I recommend patient see a functional/integrative medicine specialist to help determine the cause of his food related pain flares and find the appropriate diet for him to follow to control his symptoms.  Patient may follow up with Dr. Megan Young.  He may call [phone]  -If he does not get relief from the Functional Medicine doctor, I recommend that he see a neurologist to rule out any nerve disorders that may be causing his chronic pain  -If the neurologist cannot find any abnormalities, recommend he see a rheumatologist to evaluate for inflammatory conditions  -Call direct clinic number [[phone]] at any time with questions or concerns.    Albert Yeo MD [facility]  [facility] [place]          -----    SUBJECTIVE:  Tyrone Whitfield is a 25 year old male who is seen in follow-up for chronic right lower quadricep pain due to an unknown etiology. They were last seen 2/3/2021.     Since their last visit reports 0% - (About the same as last time). They  "indicate that their current pain level is 3/10. They have tried other medications: Prednisone (Medrol), home exercises, physical therapy (6 visits) and previous imaging (Other EMG on 2/25/21).      The patient is seen by themselves.    Patient's past medical, surgical, social, and family histories were reviewed today and no changes are noted.    REVIEW OF SYSTEMS:  Constitutional: NEGATIVE for fever, chills, change in weight  Skin: NEGATIVE for worrisome rashes, moles or lesions  GI/: NEGATIVE for bowel or bladder changes  Neuro: NEGATIVE for weakness, dizziness or paresthesias    OBJECTIVE:  /80   Ht 1.721 m (5' 7.75\")   Wt 77.1 kg (170 lb)   BMI 26.04 kg/m     General: healthy, alert and in no distress  HEENT: no scleral icterus or conjunctival erythema  Skin: no suspicious lesions or rash. No jaundice.  CV: regular rhythm by palpation, no pedal edema  Resp: normal respiratory effort without conversational dyspnea   Psych: normal mood and affect  Gait: normal steady gait with appropriate coordination and balance  Neuro: normal light touch sensory exam of the extremities.    MSK:  RIGHT KNEE  Inspection:    Normal alignment; no edema, erythema, or ecchymosis present  Palpation:    bony and ligamentous landmarks are nontender. Slightly decreased muscle mass compared to left quad    No effusion is present    Patellofemoral crepitus is Absent  Range of Motion:     00 extension to 1350 flexion  Strength:    Quadriceps grossly intact    Extensor mechanism intact  Special Tests:    Positive: none    Negative: MCL/valgus stress (0 & 30 deg), LCL/varus stress (0 & 30 deg), Lachman's, anterior drawer, posterior drawer, Margarita's       Independent visualization of the below image:    Patient's conditions were thoroughly discussed during today's visit with greater than 50% of the visit spent counseling the patient with total time spent face-to-face with the patient being 26 minutes.    Albert Yeo MD, " [facility]

## 2021-04-13 ENCOUNTER — OFFICE VISIT - HEALTHEAST (OUTPATIENT)
Dept: FAMILY MEDICINE | Facility: CLINIC | Age: 26
End: 2021-04-13

## 2021-04-13 DIAGNOSIS — R53.82 CHRONIC FATIGUE: ICD-10-CM

## 2021-04-13 DIAGNOSIS — M79.10 MUSCLE PAIN: ICD-10-CM

## 2021-04-13 DIAGNOSIS — E55.9 VITAMIN D DEFICIENCY: ICD-10-CM

## 2021-04-13 ASSESSMENT — MIFFLIN-ST. JEOR: SCORE: 1700.67

## 2021-04-20 ENCOUNTER — THERAPY VISIT (OUTPATIENT)
Dept: PHYSICAL THERAPY | Facility: CLINIC | Age: 26
End: 2021-04-20
Payer: COMMERCIAL

## 2021-04-20 DIAGNOSIS — M79.10 MUSCLE PAIN: ICD-10-CM

## 2021-04-20 PROCEDURE — 97110 THERAPEUTIC EXERCISES: CPT | Mod: GP | Performed by: PHYSICAL THERAPIST

## 2021-04-20 NOTE — PROGRESS NOTES
DISCHARGE REPORT    Progress reporting period is from 3/16/2021 to 4/20/2021.       SUBJECTIVE  Subjective changes noted by patient:  .  Subjective: Pt is reporting less numbness/tingling in shoulder.  He still gets minor twinges/pain in anterior shoulder.  working on eccentric work.  Pt scored 6.92 from 15.28 on SPADI.  Pt is seeing functional medicine MD.  He continues to have complaints of pain in R quad/knee.  Pt has tried PT for dry needling and blood flow restriction and reports no change.      Current pain level is 3/10  .     Previous pain level was   Initial Pain level: 6/10.   Changes in function:  Yes (See Goal flowsheet attached for changes in current functional level)  Adverse reaction to treatment or activity: None    OBJECTIVE  Changes noted in objective findings:    Objective: full shoulder AROM all painfree.  Full shoulder strength 5/5 all directions painfree.  min winging of L scapula noted.       ASSESSMENT/PLAN  Updated problem list and treatment plan: Diagnosis 1:  L shoulder pain  Decreased strength - therapeutic exercise, therapeutic activities and home program  Impaired posture - neuro re-education, therapeutic activities and home program  Instability -  Therapeutic Activity  Therapeutic Exercise  home program  STG/LTGs have been met or progress has been made towards goals:  Yes (See Goal flow sheet completed today.)  Assessment of Progress: The patient has met all of their long term goals.  The patient's progress has plateaued.  Self Management Plans:  Patient is independent in a home treatment program.  Patient is independent in self management of symptoms.  I have re-evaluated this patient and find that the nature, scope, duration and intensity of the therapy is appropriate for the medical condition of the patient.  Tyrone continues to require the following intervention to meet STG and LTG's:  PT intervention is no longer required to meet STG/LTG.    Recommendations:  This patient is ready  to be discharged from therapy and continue their home treatment program.     Please refer to the daily flowsheet for treatment today, total treatment time and time spent performing 1:1 timed codes.

## 2021-04-23 ENCOUNTER — AMBULATORY - HEALTHEAST (OUTPATIENT)
Dept: LAB | Facility: CLINIC | Age: 26
End: 2021-04-23

## 2021-04-23 DIAGNOSIS — E55.9 VITAMIN D DEFICIENCY: ICD-10-CM

## 2021-04-23 DIAGNOSIS — M79.10 MUSCLE PAIN: ICD-10-CM

## 2021-04-23 DIAGNOSIS — R53.82 CHRONIC FATIGUE: ICD-10-CM

## 2021-04-23 LAB
CK SERPL-CCNC: 222 U/L (ref 30–190)
CRP SERPL HS-MCNC: 1.6 MG/L (ref 0–3)
GGT SERPL-CCNC: 15 U/L (ref 0–50)
T3FREE SERPL-MCNC: 3.3 PG/ML (ref 1.9–3.9)
T4 FREE SERPL-MCNC: 1 NG/DL (ref 0.7–1.8)
TSH SERPL DL<=0.005 MIU/L-ACNC: 2.27 UIU/ML (ref 0.3–5)
VIT B12 SERPL-MCNC: 595 PG/ML (ref 213–816)

## 2021-04-26 LAB
25(OH)D3 SERPL-MCNC: 48.4 NG/ML (ref 30–80)
B BURGDOR IGG+IGM SER QL: 0.05 INDEX VALUE
ZINC SERPL-MCNC: 79.4 UG/DL (ref 60–120)

## 2021-04-27 LAB
MAGNESIUM,RBC - HISTORICAL: 4.4 MG/DL (ref 3.5–7.1)
SPECIMEN STATUS: NORMAL

## 2021-04-29 ENCOUNTER — RECORDS - HEALTHEAST (OUTPATIENT)
Dept: ADMINISTRATIVE | Facility: OTHER | Age: 26
End: 2021-04-29

## 2021-04-30 LAB
B BURGDOR AB SER-IMP: NORMAL
LYME AB IGG BAND(S): NORMAL
LYME AB IGM BAND(S): NORMAL
LYME IGG BLOT: NEGATIVE
LYME IGM BLOT: NEGATIVE

## 2021-05-02 ENCOUNTER — COMMUNICATION - HEALTHEAST (OUTPATIENT)
Dept: FAMILY MEDICINE | Facility: CLINIC | Age: 26
End: 2021-05-02

## 2021-05-04 ENCOUNTER — OFFICE VISIT - HEALTHEAST (OUTPATIENT)
Dept: FAMILY MEDICINE | Facility: CLINIC | Age: 26
End: 2021-05-04

## 2021-05-04 DIAGNOSIS — M79.10 MUSCLE PAIN: ICD-10-CM

## 2021-05-04 DIAGNOSIS — R53.82 CHRONIC FATIGUE: ICD-10-CM

## 2021-06-05 VITALS — WEIGHT: 165.06 LBS | HEIGHT: 68 IN | BODY MASS INDEX: 25.1 KG/M2 | BODY MASS INDEX: 25.01 KG/M2

## 2021-06-16 NOTE — PATIENT INSTRUCTIONS - HE
Please make a follow-up visit for lab work.    Consider ALCAT food sensitivity testing- [email or web address]    Consider Peter GI Stool Effects and Metabolomix testing - [email or web address]    Consider BEMER therapy.

## 2021-06-16 NOTE — PROGRESS NOTES
Tyrone was seen today for initial functinal medicine consult.    Diagnoses and all orders for this visit:    Muscle pain- He appears to have food sensitivities so would be good to take closer look at gut/ digestion.  -     C -Reactive Protein, High Sensitivity; Future  -     Magnesium, Red Blood Cell; Future  -     CK Total; Future  -     Lyme Antibody [place]; Future  -     Lyme Disease Antibody Confirmation; Future  -     Thyroid Stimulating Hormone (TSH); Future  -     T3 (Triiodothyronine), Free; Future  -     T4, Free; Future    Chronic fatigue- Consider Metabolomix to look for mitochondrial dysfunction/ evaluate for chronic Lyme's  -     GGT (Gamma GT); Future  -     Vitamin B12; Future  -     Zinc, Serum or Plasma; Future    Vitamin D deficiency  -     Vitamin D, Total (25-Hydroxy); Future       Patient Instructions   Please make a follow-up visit for lab work.    Consider ALCAT food sensitivity testing- [employer]    Consider Peter GI Stool Effects and Metabolomix testing - [email or web address]    Consider BEMER therapy.           SUBJECTIVE: Tyrone Whitfield is a 25 y.o. male who presents for a functional medicine consult with the following concerns:    1) Muscle and joint pain- Started age 19.  Worked out one day then afterwards with throbbing pain in hips. Kept working out - having reoccurring injuries all over body.  Out of comission for 9 months.  Any times he works out / eats certains foods has crippling pain / throbbing and burning in different parts of his body.  Had MRI on neck / leg.  Saw rheumatology and had extensive work-up in 2015 with no obvious diagnosis.  Location of the pain changes - burning in right quad and numbness / tingling upper back.  Has burning left forearm/ biceps.  Left achilles- tendonosis.  Hips are throbbing.  Never pain free- always dull ache.  Flare-ups due to sugar/ grains / wheat / dairy.   In general feels his digestion is good.    2) Also has lot of fatigue.  Was  "diagnosed with depression and is on Wellbutrin- helps with energy and focus.    TIMELINE:    Antecedents ( Preconception / prenatal ): Concerns about cocaine use with his mother.  Triggers:  Birth - ? Premature.  Not sure if vaginal/ .  Not sure if breast or bottle fed.  Early childhood- Tourette's age 8.  Started 4th/ 5th grade.  Adolescence - Chronic fatigue.  Adulthood-   Patient Active Problem List   Diagnosis     Marcellus de la Tourette's syndrome     MDD (major depressive disorder)     Muscle pain     Chronic fatigue        Mediators/ Perpetuators:    SH: Household- Lives with girlfriend and her family.    Employment - Works as . [employer].  Sleep - Struggled with in past.  Can fall asleep easily now.  Tries to get 7 hours but can get less than 5.  Movement - 8000 steps daily.  Cannot do squats/ dead lifts.  Some lifting several times a week.  Stretches.  Nutrition - B- orange/ banana / grain-free cereal / plant based protein shake / 6 oz chicken / mushrooms/ sweet potatoes.  Sensitive to sugar.  Caffiene - 200-300 mg daily / coffee.  Alcohol - once a week wine or vodka.  Cannabis causes fatigue.  Stressors - work / relationship / physical symptoms  Spirituality - no  Social connection - brother / friends/ estranged from parents since teenager    Past treatments:  Medical- Robaxin- helps a little, ibuprofen - no help, steroids - no help, gentle massage.  Complementary - chiropractor/ acupuncture/ homeopath- helped a little   Supplements- creatine off / on causes less pain    MSQ: 46  Perceived Stress Scale: 15      OBJECTIVE: BP 94/58 (Patient Site: Left Arm, Patient Position: Sitting, Cuff Size: Adult Regular)   Pulse 80   Ht 5' 8\" (1.727 m)   Wt 164 lb 8 oz (74.6 kg)   SpO2 99%   BMI 25.01 kg/m     GENERAL: Healthy, alert and no distress  EYES: Eyes grossly normal to inspection. No discharge or erythema, or obvious scleral/conjunctival abnormalities.  RESP: " No audible wheeze, cough, or visible cyanosis.  No visible retractions or increased work of breathing.    NEURO: Cranial nerves grossly intact. Mentation and speech appropriate for age.  PSYCH: Mentation appears normal, affect normal/bright, judgement and insight intact, normal speech and appearance well-groomed      LAB:   I reviewed lab work done at   Hep C negative  MISHEL negative        Total time spent face to face / reviewing past lab data / discussing plan of care and future testing/ documentation - 55 minutes.    Megan Young MD

## 2021-06-17 NOTE — PROGRESS NOTES
Tyrone Whitfield is a 25 y.o. male who is being evaluated via a billable telephone visit.      What phone number would you like to be contacted at? [phone]   How would you like to obtain your AVS? AVS Preference: Mail a copy.    Assessment & Plan     Tyrone was seen today for test results.    Diagnoses and all orders for this visit:    Muscle pain    Chronic fatigue    Tyrone was seen today for test results.    Diagnoses and all orders for this visit:    Muscle pain-  Not sure the CK elevation is significant.  He had a normal CK back in 2015.  I do recommend magnesium supplementation.    Chronic fatigue- Normal thyroid function and negative testing for Lyme disease with conventional testing.      We discussed next steps.  Since he does feel that food affects his symptoms, I recommend testing for food sensitivity.  I also think that getting individualized nutrient needs would be helpful using Metabolomix from [employer].  He is interested in pursuing both tests after counseling.      Patient Instructions   1) Schedule a lab only visit at [facility] for the ALCAT test for food sensitivity.  Payment is due at time of testing by check/ credit card.  Once results are back you can schedule complementary 30 minute consult:    To schedule a complementary 30 minute consult for the ALCAT test go to: [email or web address]  To download a guide for the ALCAT results go to: [email or web address]     2) I will send out the [employer] Metabolomix kit to your home.  Follow directions for the urine collection.  I recommend you hold all supplements before doing the test as directed in the instructions.  Cost is $329 out of pocket.  You can submit to insurance with down payment of $ 180 but if insurance does not cover, you could be asked to pay $372 total.    I will contact you after the test results are back to discuss.       Review of the result(s) of each unique test - Recent and past lab work  35 minutes spent  "on the date of the encounter doing chart review, history and exam, documentation and further activities per the note       BMI:   Estimated body mass index is 25.1 kg/m  as calculated from the following:    Height as of 4/29/21: 5' 8\" (1.727 m).    Weight as of 4/29/21: 165 lb 1 oz (74.9 kg).       Return in about 4 weeks (around 6/1/2021) for Recheck.    Megan Young MD  [facility]   Tyrone Whitfield is 25 y.o. and presents today for the following health issues   HPI  Tyrone complains of fatigue/ muscle pain.  He is here to discuss recent lab tests and consider further functional medicine testing.  He denies any history of toxin exposure.  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      Review of Systems  As above      Objective       Vitals:  No vitals were obtained today due to virtual visit.    Physical Exam  none    Labs:    Sub-optimal magnesium  Slightly elevated CK          Phone call duration: 30 minutes  "

## 2021-06-17 NOTE — PATIENT INSTRUCTIONS - HE
1) Schedule a lab only visit at [facility] for the ALCAT test for food sensitivity.  Payment is due at time of testing by check/ credit card.  Once results are back you can schedule complementary 30 minute consult:    To schedule a complementary 30 minute consult for the ALCAT test go to: [email or web address]  To download a guide for the ALCAT results go to: [email or web address]     2) I will send out the [employer] kit to your home.  Follow directions for the urine collection.  I recommend you hold all supplements before doing the test as directed in the instructions.  Cost is $329 out of pocket.  You can submit to insurance with down payment of $ 180 but if insurance does not cover, you could be asked to pay $372 total.    I will contact you after the test results are back to discuss.

## 2021-06-21 NOTE — LETTER
Letter by Megan Young MD at      Author: Megan Young MD Service: -- Author Type: --    Filed:  Encounter Date: 5/2/2021 Status: (Other)         Tyrone Whitfield  [address] [facility] [address]             May 2, 2021         Dear Mr. Whitfield,    Below are the results from your recent visit:    Resulted Orders   C -Reactive Protein, High Sensitivity   Result Value Ref Range    CRP, High Sensitivity 1.6 0.0 - 3.0 mg/L    Narrative    low risk < 1.0 mg/l  average risk 1.0 to 3.0 mg/l  hi risk > 3.0 mg/l  acute inflamation > 10.0 mg/l         Magnesium, Red Blood Cell   Result Value Ref Range    Scan Result See Scanned Report     Magnesium Red Blood Cell 4.4 3.5 - 7.1 mg/dL      Comment:        This test was developed and its performance characteristics determined by [facility]. It has not been cleared or approved by the Food and Drug Administration.    Performed by:                [facility]  [address] [place], [place] [id number] [phone]   GGT (Gamma GT)   Result Value Ref Range    GGT (Gamma GT) 15 0 - 50 U/L   Vitamin D, Total (25-Hydroxy)   Result Value Ref Range    Vitamin D, Total (25-Hydroxy) 48.4 30.0 - 80.0 ng/mL    Narrative    Deficiency <10.0 ng/mL  Insufficiency 10.0-29.9 ng/mL  Sufficiency 30.0-80.0 ng/mL  Toxicity (possible) >100.0 ng/mL   CK Total   Result Value Ref Range    CK, Total 222 (H) 30 - 190 U/L   Lyme Antibody Cascade   Result Value Ref Range    Lyme Antibody [place] 0.05 <0.90 Index Value    Narrative    Interpretation of Lyme Disease Total Antibody (IgG/IgM)  <0.90 Test Value=Negative  No detectable antibodies to B. burgdorferi. Patients  in early stages of infection may not produce  detectable levels of antibody. Antibiotic therapy  in early disease may prevent antibody production  from reaching detectable levels. Patients with  clinical history and/or symptoms suggestive of Lyme  disease but with negative test results should  be  retested in 2-4 weeks.  0.90-<1.10 Test Value=Borderline  Suggests the presence of antibodies to B.  burgdorferi. Recommend repeat collection in 2-4  weeks.  >=1.10 Test Value=Positive  Indicates the presence of antibodies to B.  burgdorferi. False positive results can occur with  sera from syphilis patients. Cross-reactivity may  occur with relapsing fever, [place] Spotted  fever, other spirochetal diseases, erythematosus,  EBV infection, or CMV infection. Clinical symptoms,  epidemiology of the case and other laboratory tests  should allow for distinction of these conditions from  Lyme disease.   Lyme Disease Antibody Confirmation   Result Value Ref Range    Lyme Ab, IgG Blot Negative Negative    Lyme Ab, IgG Band(s) no bands detected     Lyme Ab, IgM Blot  Negative Negative    Lyme Ab, IgM Band(s) No bands detected     Lyme Immuno Blot Interpretation        Comment:      Specific serologic response to Borrelia burgdorferi                  is not detected, but cannot rule out early                                                                    infection during which low or undetectable antibody                levels to B. burgdorferi may be present. If                         clinically indicated a new specimen should be                       submitted in 2-3 weeks.       Narrative    CDC criteria require >=5 bands for IgG or >=2 bands for IgM  for the Blot to be considered positive. Bands(e.g. p41) may  be detected in patients without Lyme disease; and patterns  not meeting the CDC criteria should be interpreted with  Caution.    Lyme Blot should be ordered only on specimens that are  positive or equivocal by a FDA-licensed Lyme disease  antibody screening test.   Thyroid Stimulating Hormone (TSH)   Result Value Ref Range    TSH 2.27 0.30 - 5.00 uIU/mL   T3 (Triiodothyronine), Free   Result Value Ref Range    T3, Free 3.3 1.9 - 3.9 pg/mL   T4, Free   Result Value Ref Range    Free T4 1.0 0.7 - 1.8  ng/dL   Vitamin B12   Result Value Ref Range    Vitamin B-12 595 213 - 816 pg/mL   Zinc, Serum or Plasma   Result Value Ref Range    Zinc, Serum/Plasma 79.4 60.0 - 120.0 ug/dL      Comment:      INTERPRETIVE INFORMATION: Zinc, Serum or Plasma    Elevated results may be due to skin or collection-related   contamination, including the use of a noncertified metal-free   collection/transport tube. If contamination concerns exist due to   elevated levels of serum/plasma zinc, confirmation with a second   specimen collected in a certified metal-free tube is recommended.    Circulating zinc concentrations are dependent on albumin status   and are depressed with malnutrition.  Zinc may also be lowered   with infection, inflammation, stress, oral contraceptives, and   pregnancy.  Zinc may be elevated with zinc supplementation or   fasting.  Elevated zinc concentrations may interfere with copper   absorption.     This test was developed and its performance characteristics   determined by [facility]. It has not been cleared or   approved by the US Food and Drug Administration. This test was   performed in a CLIA certified laboratory and is intended for   clinical  purposes.  Performed By: [facility]  [address] [place] [id number]  : Ariana Weinberg MD       Your lab results have all returned.  Most of the tests are normal/ negative.  Your magnesium level is sub-optimal so I recommend magnesium supplementation.  Your CK is a little elevated - this is a muscle enzyme that can go up after exercise but can also represent muscle injury.  We can discuss further at your follow-up visit.    Please call with questions or contact us using [employer].    Sincerely,        Electronically signed by Megan Young MD

## 2021-09-04 ENCOUNTER — HEALTH MAINTENANCE LETTER (OUTPATIENT)
Age: 26
End: 2021-09-04

## 2021-10-18 NOTE — PROGRESS NOTES
[facility] Pain Management     Date of visit: 10/18/2021      Assessment:   Tyrone Whitfield is a 25 year old male who presents today with the complaints of:     1. Myofascial pain: Reports pain in left shoulder, right knee and posterolateral hips. Pain has been present for about 5 years and is typically aggravated with exercise. MRI of cervical spine was completed to evaluate left arm symptoms and this did not show any acute pathology. Physical therapy was only somewhat helpful. May be a component of subscapularis bursitis.       Visit Diagnoses:  1. Myofascial pain    2. Chronic left shoulder pain    3. Pain of right lower leg        Plan:     1.  Pain Physical Therapy:     YES  There was some benefit and progress with physical therapy. He is interested in repeating. New order placed today.    2.  Pain Psychologist to address relaxation, behavioral change, coping style, and other factors important to improvement.  NO not at this time.    3.  Medication Management:     1. Okay to continue Robaxin 500mg QID prn for pain as this has been somewhat helpful.    2. He describes pain as burning. We will trial gabapentin. Start gabapentin 300mg at bedtime. Monitor pain and sleep benefit.     4.  Referrals: order placed for Orthopedic shoulder specialist to evaluate for subscapularis bursitis. An injection may be recommended to address  left shoulder and forearm pain.    5.  Follow up with SAMANTHA Judd CNP in 4-6 weeks.       Lucy SINGH CNP  [facility] Pain Management     -------------------------------------------------    Subjective:    Chief complaint:   Chief Complaint   Patient presents with     Pain       Interval history:  Tyrone Whitfield is a 25 year old male last seen on 2/17/2021.  he is a patient of Dr. Allred seen in follow up.     Recommendations/plan at the last visit included:  Additional Workup:   1. Diagnostic Studies: He has a NCS/EMG scheduled in 1-2 weeks.   2. Laboratory  studies: None  Therapies:   3. Physical Therapy: Continue working with PT for shoulder pain as well as continuing blood flow restriction therapy for the right leg pain.   4. Clinical Health Psychologist to address issues of relaxation, behavioral change, coping style, and other factors important to improvement: not at this time  Medication Management:   5. Start methocarbamol 500 mg QID prn.   6. Continue utilizing topical medications as long as they are providing some benefit  7. Consider addition of Cymbalta in the future for chronic musculoskeletal pain if there is no improvement in pain with therapies.   Other:   8. Recommend trial of acupuncture to see if it provides some benefit.   Further procedures recommended:   9. Consider TPI. I would need to do an in person exam to see if this would be appropriate to try.   10. I would need to do an exam before making any other procedure recommendations.      Follow up: 4-6 weeks for an in person visit.     Since his last visit, Tyrone Whitfield reports:  -His pain is about the same as it was at last visit, may be somewhat better.   -He isn't sure what to attribute improvement to be due to. He continues to struggle with bilateral posterior lateral hip pain, left posterior scapular pain, left forearm, and right quadriceps pain.   -He had a EMG and nerve conduction study which was normal.   -He was seen by Dr. Yeo and referred to functional medicine. He had two visits with Dr. Young and had testing completed. Nothing new was tried.   -He completed physical therapy with limited benefit. He tried dry needling with some short term benefit.   -He started Robaxin 500mg QID prn, notes this does seem somewhat helpful for his mood but doesn't note specific improvement in pain.   -He states he remembers taking Cymbalta, though it is not noted in his chart.   -He is interested in trigger point injections, states he hasn't tried these before and would like to consider.     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 He reports onset of pain about 5 years ago. No specific inciting event but he was working out a lot at that time and thinks this could have aggravated symptoms.      Pain started in the bilateral hips and is mostly located in the posterolateral aspect of the hips. This is aching and throbbing in quality. He reports having pain in the gluts and hamstrings. He has pain in the left shoulder and is noted to have some scapular dyskinesis. Reports having tingling into the left proximal arm and forearm. Has some chronic tension in the trapezius. Also has pain in the right quadriceps that is burning in quality. Pain overall is constant but varies in severity. Typically aggravated with exercise and inflammatory foods and poor sleep. He has some relief with massage, TENS unit, ice, topical medications    Pain Information:   Pain rating: averages 4/10 on a 0-10 scale.    Current pain medications:    Robaxin 500mg QID prn- [facility] for relaxation    OTC topicals - [facility] (prefers a capsaicin type product)- [facility], short term    Other Relevant Medications:    Wellbutrin 150mg SR daily- [facility] for energy and focus     Current MME: 0    Review of [place] Prescription Monitoring Program (): No concern for abuse or misuse of controlled medications based on this report.     Annual Controlled Substance Agreement/UDS due date: NA    Previous Pain Relevant Medications: ([facility]--helped; [facility]--Helped initially; [facility]--Somewhat helpful; [facility]--No help; W--worse; SE--side effects; ?--Unsure if helpful)   NOTE: This medication information taken from patient's intake form, not medical records.   Opiates: na  NSAIDS: ibuprofen, aleve - [facility]  Anti-migraine mediations: na  Muscle Relaxants: Has used one in the past but unsure of the name - [facility]  Neuropathics: na  Anti-depressants: Cymbalta- [facility], \"made you feel like a zombie,\" decreased appetite, Wellbutrin- [facility]  Anxiolytics: na  Topicals: " na  Sleep aids:na  Other medications not covered above: Tylenol - [facility], medrol dose avis - [facility], CBD oil - helped relax but [facility] for pain     Past Pain Treatments:   Pain Clinic:   No  PT: Yes - [facility]  Pain psychologist: No  Relaxation techniques/biofeedback: No  Chiropractor: Yes - [facility]  Acupuncture: No  TENs Unit:Yes - [facility]  Injections: No  Self-care:   Yes - massage - [facility], ice - [facility]  Surgeries related to pain: No    Medications:  Current Outpatient Medications   Medication Sig Dispense Refill     methocarbamol (ROBAXIN) 500 MG tablet Take 1 tablet (500 mg) by mouth 4 times daily as needed for muscle spasms 120 tablet 1     Multiple Vitamins-Minerals (MULTIVITAMIN & MINERAL PO) Take 1 tablet by mouth daily       buPROPion (WELLBUTRIN SR) 150 MG 12 hr tablet TK 1 T PO QAM         Medical History: any changes in medical history since they were last seen? No    Review of Systems: A 10-point review of systems was negative, with the exception of chronic pain issues.      Objective:    Physical Exam:  Blood pressure 102/70, pulse 60, weight 74.9 kg (165 lb 1.6 oz), SpO2 99 %.  Constitutional: Well developed, well nourished, appears stated age.  Gait: Normal  HEENT: Head atraumatic, normocephalic. Eyes without conjunctival injection or jaundice. Oropharynx clear. Neck supple. No obvious neck masses.  Skin: No rash, lesions, or petechiae of exposed skin.   Psychiatric/mental status: Alert, without lethargy or stupor. Speech fluent. Appropriate affect. Mood normal. Able to follow commands without difficulty.     Myofascial tenderness: none significant.     Imaging:  Imaging Studies:   MRI of Cervical Spine completed on 1/27/2021 shows:  FINDINGS: Alignment, vertebral body heights and disc heights are  normal. Marrow signal and spinal cord signal are normal. The facets  articulate normally. The paraspinous soft tissues are unremarkable.  The cervicomedullary junction is patent. The spinal canal and neural  foramina  are widely patent throughout the cervical spine.                                                                      IMPRESSION: Normal cervical spine MRI.     MRI of Right Knee completed on 1/27/2021 shows:  Findings:     MENISCI:  Medial meniscus: Intact.  Lateral meniscus: Intact.     LIGAMENTS  Cruciate ligaments: Intact.  Medial supporting structures: Intact.  Lateral supporting structures: Mild edema deep to the iliotibial band,  query iliotibial band friction syndrome. Otherwise, the lateral  supporting structures are intact     EXTENSOR MECHANISM  Intact.     FLUID  No joint effusion. No substantial Baker's cyst.     OSSEOUS and ARTICULAR STRUCTURES  Bones: No fracture, contusion, or osseous lesion is seen. Foci of  hypointensities in the distal femur, likely bone islands.     Patellofemoral compartment: Increased T2 signal lateral patellar facet  articular cartilage, query low-grade chondromalacia.     Medial compartment: Signal heterogeneity with superficial chondral  loss of the posterior weightbearing surface of medial femoral condyle.     Lateral compartment: Signal heterogeneity posterior weightbearing  surface of the lateral femoral condyle.     ANCILLARY FINDINGS  Mild muscle edema of the soleus, likely related to muscle strain.                                                                      Impression:  1. No tear or tendinosis of the quadriceps tendon.  2. Query iliotibial band friction syndrome.  3. 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 No  high-grade chondral loss.  4. Mild muscle edema of the soleus, likely related to muscle strain.     MR Pelvis completed on 8/19/2015 showed:  Findings:     The visualized lower lumbar spine is unremarkable. No abnormal marrow  signal within the visualized pelvis to suggest fracture or  osteonecrosis. No stress changes.     No significant fluid in either sacroiliac joint. No abnormal  enhancement to suggest synovitis. No erosive  changes.     The muscle bulk is symmetric without significant atrophy or edema.     IMPRESSION  Impression: No findings to suggest sacroiliitis. No findings in the  visualized pelvis to explain patient's back pain.    BILLING TIME DOCUMENTATION:   The total TIME spent on this patient on the date of the encounter/appointment was 36 minutes.      TOTAL TIME includes:   Time spent preparing to see the patient (reviewing records and tests)   Time spent face to face (or over the phone) with the patient   Time spent ordering tests, medications, procedures and referrals   Time spent Referring and communicating with other healthcare professionals   Time spent documenting clinical information in Epic

## 2021-10-19 ENCOUNTER — OFFICE VISIT (OUTPATIENT)
Dept: PALLIATIVE MEDICINE | Facility: CLINIC | Age: 26
End: 2021-10-19
Payer: COMMERCIAL

## 2021-10-19 VITALS
DIASTOLIC BLOOD PRESSURE: 70 MMHG | SYSTOLIC BLOOD PRESSURE: 102 MMHG | WEIGHT: 165.1 LBS | HEART RATE: 60 BPM | BODY MASS INDEX: 25.1 KG/M2 | OXYGEN SATURATION: 99 %

## 2021-10-19 DIAGNOSIS — M79.18 MYOFASCIAL PAIN: Primary | ICD-10-CM

## 2021-10-19 DIAGNOSIS — M25.512 CHRONIC LEFT SHOULDER PAIN: ICD-10-CM

## 2021-10-19 DIAGNOSIS — M79.661 PAIN OF RIGHT LOWER LEG: ICD-10-CM

## 2021-10-19 DIAGNOSIS — G89.29 CHRONIC LEFT SHOULDER PAIN: ICD-10-CM

## 2021-10-19 PROCEDURE — 99203 OFFICE O/P NEW LOW 30 MIN: CPT | Performed by: NURSE PRACTITIONER

## 2021-10-19 RX ORDER — GABAPENTIN 300 MG/1
300 CAPSULE ORAL AT BEDTIME
Qty: 30 CAPSULE | Refills: 1 | Status: SHIPPED | OUTPATIENT
Start: 2021-10-19 | End: 2022-05-26

## 2021-10-19 ASSESSMENT — PAIN SCALES - GENERAL: PAINLEVEL: MODERATE PAIN (4)

## 2021-10-19 NOTE — PATIENT INSTRUCTIONS
1.  Pain Physical Therapy:     YES  Lets try repeating physical therapy, new order placed today.    2.  Pain Psychologist to address relaxation, behavioral change, coping style, and other factors important to improvement.  NO not at this time.    3.  Medication Management:     1. Okay to continue Robaxin 500mg four times daily as needed for pain.    2. Start gabapentin 300mg at bedtime. Monitor pain and sleep benefit.     4.  Referrals: order placed for Orthopedic shoulder specialist to evaluate for subscapularis bursitis. An injection may be recommended to address your left shoulder and forearm pain.    5.  Follow up with SAMANTHA Judd CNP in 4-6 weeks.       ----------------------------------------------------------------  Clinic Number:  [phone]     Call with any questions about your care and for scheduling assistance.     Calls are returned Monday through Friday between 8 AM and 4:30 PM. We usually get back to you within 2 business days depending on the issue/request.    If we are prescribing your medications:    For opioid medication refills, call the clinic or send a [employer] message 7 days in advance.  Please include:    Name of requested medication    Name of the pharmacy.    For non-opioid medications, call your pharmacy directly to request a refill. Please allow 3-4 days to be processed.     Per [place] State Law:    All controlled substance prescriptions must be filled within 30 days of being written.      For those controlled substances allowing refills, pickup must occur within 30 days of last fill.      We believe regular attendance is key to your success in our program!      Any time you are unable to keep your appointment we ask that you call us at least 24 hours in advance to cancel.This will allow us to offer the appointment time to another patient.     Multiple missed appointments may lead to dismissal from the clinic.

## 2021-10-27 NOTE — PROGRESS NOTES
ASSESSMENT & PLAN  Patient Instructions     1. Myofascial pain    2. Chronic left shoulder pain      -Patient has chronic posterior shoulder pain and left upper arm pain when working out possibly due to an underlying labral and or rotator cuff tears  -Patient will get an MR arthrogram of the left shoulder for further evaluation.   Your MRI has been ordered. You may call [phone] to schedule over the phone. Once you get notified on [employer] of your results, send me a [employer] message to discuss results and next of treatment options.    -Patient was initially sent to us from pain management for evaluation of subscapularis bursitis.  He likely has some bursitis but I am concerned about structural injuries due to the chronic and severe nature of his symptoms.  Patient has had a complete work-up of his cervical spine which does not explain his current symptoms  -Patient has not responded to physical therapy and oral medications  -Call direct clinic number [[phone]] at any time with questions or concerns.    Albert Yeo MD [facility] and Sports Medicine  [facility]          -----    SUBJECTIVE:  Tyrone Whitfield is a 25 year old male who is seen in follow-up for left shoulder  They were last seen regarding left shoulder on 1/14/2021 by .  Patient states he saw Lucy Steiner CNP, with pain management, who suggested he see Dr.Yeo for subscapularis bursitis.  Patient is currently in physical therapy for his left shoulder, doing at home exercises. He does not feel like these exercises are helping.     Since their last visit reports 0% - (About the same as last time). They indicate that their current pain level is 4/10. Patient points to posterior shoulder, notes burning pain that is localized. They have tried physical therapy, rest/activity avoidance, ice, ibuprofen, Gabapentin, and Aleve. Notes Gabapentin gave him night terrors, stopped taking this    The patient is seen by  "themselves.    Patient's past medical, surgical, social, and family histories were reviewed today and no changes are noted.    REVIEW OF SYSTEMS:  Constitutional: NEGATIVE for fever, chills, change in weight  Skin: NEGATIVE for worrisome rashes, moles or lesions  GI/: NEGATIVE for bowel or bladder changes  Neuro: NEGATIVE for weakness, dizziness or paresthesias    OBJECTIVE:  /88   Ht 1.715 m (5' 7.5\")   Wt 75.4 kg (166 lb 3.2 oz)   BMI 25.65 kg/m     General: healthy, alert and in no distress  HEENT: no scleral icterus or conjunctival erythema  Skin: no suspicious lesions or rash. No jaundice.  CV: regular rhythm by palpation, no pedal edema  Resp: normal respiratory effort without conversational dyspnea   Psych: normal mood and affect  Gait: normal steady gait with appropriate coordination and balance  Neuro: normal light touch sensory exam of the extremities.    MSK:  Inspection:    no atrophy  Palpation:    Tender about the posterior capsule, medial border of scapula. Remainder of bony and tendinous landmarks are nontender.  Active Range of Motion:     Abduction 1800, FF 1800,   Strength:    Scapular plane abduction 5/5,  ER 5/5, IR 5/5, biceps 5/5  Special Tests:    Positive: Symone (TOS), DuPage's, sulcus sign    Negative: Roberson', supraspinatus (empty can), crossed arm adduction and Speed's         Albert Yeo MD, [facility]        "

## 2021-11-03 ENCOUNTER — THERAPY VISIT (OUTPATIENT)
Dept: PHYSICAL THERAPY | Facility: CLINIC | Age: 26
End: 2021-11-03
Payer: COMMERCIAL

## 2021-11-03 ENCOUNTER — OFFICE VISIT (OUTPATIENT)
Dept: ORTHOPEDICS | Facility: CLINIC | Age: 26
End: 2021-11-03
Attending: NURSE PRACTITIONER
Payer: COMMERCIAL

## 2021-11-03 VITALS
DIASTOLIC BLOOD PRESSURE: 88 MMHG | SYSTOLIC BLOOD PRESSURE: 120 MMHG | BODY MASS INDEX: 25.19 KG/M2 | HEIGHT: 68 IN | WEIGHT: 166.2 LBS

## 2021-11-03 DIAGNOSIS — G89.29 CHRONIC LEFT SHOULDER PAIN: ICD-10-CM

## 2021-11-03 DIAGNOSIS — M79.18 MYOFASCIAL PAIN: ICD-10-CM

## 2021-11-03 DIAGNOSIS — M25.512 CHRONIC LEFT SHOULDER PAIN: ICD-10-CM

## 2021-11-03 DIAGNOSIS — Z11.59 ENCOUNTER FOR SCREENING FOR OTHER VIRAL DISEASES: ICD-10-CM

## 2021-11-03 DIAGNOSIS — M79.661 PAIN OF RIGHT LOWER LEG: ICD-10-CM

## 2021-11-03 PROCEDURE — 97110 THERAPEUTIC EXERCISES: CPT | Mod: GP | Performed by: PHYSICAL THERAPIST

## 2021-11-03 PROCEDURE — 97161 PT EVAL LOW COMPLEX 20 MIN: CPT | Mod: GP | Performed by: PHYSICAL THERAPIST

## 2021-11-03 PROCEDURE — 99214 OFFICE O/P EST MOD 30 MIN: CPT | Performed by: FAMILY MEDICINE

## 2021-11-03 PROCEDURE — 97530 THERAPEUTIC ACTIVITIES: CPT | Mod: GP | Performed by: PHYSICAL THERAPIST

## 2021-11-03 ASSESSMENT — MIFFLIN-ST. JEOR: SCORE: 1705.44

## 2021-11-03 NOTE — PROGRESS NOTES
"Physical Therapy Initial Evaluation  Subjective:    Patient Health History  Tyrone Whitfield being seen for L shoulder, L upper back, L arm, L achilles.     Problem began: 10/19/2021.   Problem occurred: injuries after exercise   Pain is reported as 4/10 on pain scale.  General health as reported by patient is good.  Pertinent medical history includes: depression.   Red flags:  None as reported by patient.  Medical allergies: none.   Surgeries include:  None.    Current medications:  Anti-depressants, anti-inflammatory and muscle relaxants.    Current occupation is .   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                  Therapist Generated HPI Evaluation  Problem details: Heading to Dr. Yeo for pain injections later today  Has some past history of injuries, L scapular area, L achilles pain now most troublesome  Does endorse \"pain all over\".         Type of problem:  Left shoulder.    This is a chronic condition.  Condition occurred with:  Unknown cause.    Patient reports pain:  Scapular area and upper arm.  Pain is described as aching and burning and is constant.  Pain radiates to:  Shoulder and upper arm. Pain timing: worse if pt doesn't get enough sleep, seems to be related to what he eats.  Since onset symptoms are gradually worsening.  Associated symptoms:  Tingling and numbness. Symptoms are exacerbated by using arm overhead (planking)  and relieved by ice and heat.    Previous treatment includes physical therapy (for R quad in the past). There was mild improvement following previous treatment.  Restrictions due to condition include:  Working in normal job without restrictions.  Barriers include:  None as reported by patient.                        Objective:  System                   Shoulder Evaluation:  ROM:      Pain: No pain with AROM, pt reports pain only with repetition    Strength:    Flexion: Left:5/5   Pain:    Right: 5/5     Pain:     Abduction:  Left: 5/5  Pain:  "   Right: 5/5     Pain:    Internal Rotation:  Left:5/5     Pain:    Right: 5/5     Pain:  External Rotation:   Left:5/5     Pain:         Elbow Flexion:  Left:5/5     Pain:    Right:5/5     Pain:  Elbow Extension:  Left:5/5     Pain:    Right:5/5     Pain:  Stability Testing:      Left shoulder stability negative testing:  Sulcus sign    Special Tests:      Left shoulder negative for the following special tests:  Labral    Palpation:  normal    Left shoulder tenderness not present at: Acrimioclavicular; Supraspinatus; Infraspinatus; Teres Minor; Subscapularis; Deltoid; Rhomboids or Upper Trap                                       General     ROS    Assessment/Plan:    Patient is a 25 year old male with left side shoulder complaints.    Patient has the following significant findings with corresponding treatment plan.                Diagnosis 1:  L shoulder pain  Pain -  self management, education and home program  Decreased proprioception - neuro re-education and therapeutic activities  Impaired muscle performance - neuro re-education and home program  Decreased function - therapeutic activities and home program  Instability -  Therapeutic Activity  Therapeutic Exercise  Neuromuscular Re-education    Cumulative Therapy Evaluation is: Low complexity.    Previous and current functional limitations:  (See Goal Flow Sheet for this information)    Short term and Long term goals: (See Goal Flow Sheet for this information)     Communication ability:  Patient appears to be able to clearly communicate and understand verbal and written communication and follow directions correctly.  Treatment Explanation - The following has been discussed with the patient:   RX ordered/plan of care  Anticipated outcomes  Possible risks and side effects  This patient would benefit from PT intervention to resume normal activities.   Rehab potential is fair.    Frequency:  2 X a month, once daily  Duration:  for 3 months  Discharge Plan:  Achieve  all LTG.  Independent in home treatment program.  Reach maximal therapeutic benefit.    Please refer to the daily flowsheet for treatment today, total treatment time and time spent performing 1:1 timed codes.

## 2021-11-03 NOTE — PATIENT INSTRUCTIONS
1. Myofascial pain    2. Chronic left shoulder pain      -Patient has chronic posterior shoulder pain and left upper arm pain when working out possibly due to an underlying labral and or rotator cuff tears  -Patient will get an MR arthrogram of the left shoulder for further evaluation.   Your MRI has been ordered. You may call [phone] to schedule over the phone. Once you get notified on [employer] of your results, send me a [employer] message to discuss results and next of treatment options.    -Patient was initially sent to us from pain management for evaluation of subscapularis bursitis.  He likely has some bursitis but I am concerned about structural injuries due to the chronic and severe nature of his symptoms.  Patient has had a complete work-up of his cervical spine which does not explain his current symptoms  -Patient has not responded to physical therapy and oral medications  -Call direct clinic number [[phone]] at any time with questions or concerns.    Albert Yeo MD [facility]  [facility] [place]

## 2021-11-03 NOTE — LETTER
11/3/2021         RE: Tyrone Whitfield  [address] [address] [facility] [address]        Dear Colleague,    Thank you for referring your patient, Tyrone Whitfield, to the [facility] [place]. Please see a copy of my visit note below.    ASSESSMENT & PLAN  Patient Instructions     1. Myofascial pain    2. Chronic left shoulder pain      -Patient has chronic posterior shoulder pain and left upper arm pain when working out possibly due to an underlying labral and or rotator cuff tears  -Patient will get an MR arthrogram of the left shoulder for further evaluation.   Your MRI has been ordered. You may call [phone] to schedule over the phone. Once you get notified on [employer] of your results, send me a [employer] message to discuss results and next of treatment options.    -Patient was initially sent to us from pain management for evaluation of subscapularis bursitis.  He likely has some bursitis but I am concerned about structural injuries due to the chronic and severe nature of his symptoms.  Patient has had a complete work-up of his cervical spine which does not explain his current symptoms  -Patient has not responded to physical therapy and oral medications  -Call direct clinic number [[phone]] at any time with questions or concerns.    Albert Yeo MD [facility]  [facility]          -----    SUBJECTIVE:  Tyrone Whitfield is a 25 year old male who is seen in follow-up for left shoulder  They were last seen regarding left shoulder on 1/14/2021 by .  Patient states he saw Lucy Steiner CNP, with pain management, who suggested he see Dr.Yeo for subscapularis bursitis.  Patient is currently in physical therapy for his left shoulder, doing at home exercises. He does not feel like these exercises are helping.     Since their last visit reports 0% - (About the same as last time). They indicate that their current pain  "level is 4/10. Patient points to posterior shoulder, notes burning pain that is localized. They have tried physical therapy, rest/activity avoidance, ice, ibuprofen, Gabapentin, and Aleve. Notes Gabapentin gave him night terrors, stopped taking this    The patient is seen by themselves.    Patient's past medical, surgical, social, and family histories were reviewed today and no changes are noted.    REVIEW OF SYSTEMS:  Constitutional: NEGATIVE for fever, chills, change in weight  Skin: NEGATIVE for worrisome rashes, moles or lesions  GI/: NEGATIVE for bowel or bladder changes  Neuro: NEGATIVE for weakness, dizziness or paresthesias    OBJECTIVE:  /88   Ht 1.715 m (5' 7.5\")   Wt 75.4 kg (166 lb 3.2 oz)   BMI 25.65 kg/m     General: healthy, alert and in no distress  HEENT: no scleral icterus or conjunctival erythema  Skin: no suspicious lesions or rash. No jaundice.  CV: regular rhythm by palpation, no pedal edema  Resp: normal respiratory effort without conversational dyspnea   Psych: normal mood and affect  Gait: normal steady gait with appropriate coordination and balance  Neuro: normal light touch sensory exam of the extremities.    MSK:  Inspection:    no atrophy  Palpation:    Tender about the posterior capsule, medial border of scapula. Remainder of bony and tendinous landmarks are nontender.  Active Range of Motion:     Abduction 1800, FF 1800,   Strength:    Scapular plane abduction 5/5,  ER 5/5, IR 5/5, biceps 5/5  Special Tests:    Positive: Symone (TOS), Mills's, sulcus sign    Negative: Roberson', supraspinatus (empty can), crossed arm adduction and Speed's         Albert Yeo MD, [facility]            Again, thank you for allowing me to participate in the care of your patient.        Sincerely,        Albert Yeo, MD    "

## 2021-11-12 ENCOUNTER — THERAPY VISIT (OUTPATIENT)
Dept: PHYSICAL THERAPY | Facility: CLINIC | Age: 26
End: 2021-11-12
Payer: COMMERCIAL

## 2021-11-12 DIAGNOSIS — M25.512 CHRONIC LEFT SHOULDER PAIN: ICD-10-CM

## 2021-11-12 DIAGNOSIS — G89.29 CHRONIC LEFT SHOULDER PAIN: ICD-10-CM

## 2021-11-12 PROCEDURE — 97140 MANUAL THERAPY 1/> REGIONS: CPT | Mod: GP | Performed by: PHYSICAL THERAPIST

## 2021-11-12 PROCEDURE — 97110 THERAPEUTIC EXERCISES: CPT | Mod: GP | Performed by: PHYSICAL THERAPIST

## 2021-11-19 ENCOUNTER — THERAPY VISIT (OUTPATIENT)
Dept: PHYSICAL THERAPY | Facility: CLINIC | Age: 26
End: 2021-11-19
Payer: COMMERCIAL

## 2021-11-19 DIAGNOSIS — G89.29 CHRONIC LEFT SHOULDER PAIN: ICD-10-CM

## 2021-11-19 DIAGNOSIS — M79.18 MYOFASCIAL PAIN: Primary | ICD-10-CM

## 2021-11-19 DIAGNOSIS — M25.512 CHRONIC LEFT SHOULDER PAIN: ICD-10-CM

## 2021-11-19 PROCEDURE — 97140 MANUAL THERAPY 1/> REGIONS: CPT | Mod: GP | Performed by: PHYSICAL THERAPIST

## 2021-11-19 PROCEDURE — 97035 APP MDLTY 1+ULTRASOUND EA 15: CPT | Mod: GP | Performed by: PHYSICAL THERAPIST

## 2021-12-06 ENCOUNTER — LAB (OUTPATIENT)
Dept: LAB | Facility: CLINIC | Age: 26
End: 2021-12-06
Attending: FAMILY MEDICINE
Payer: COMMERCIAL

## 2021-12-06 DIAGNOSIS — Z11.59 ENCOUNTER FOR SCREENING FOR OTHER VIRAL DISEASES: ICD-10-CM

## 2021-12-06 LAB — SARS-COV-2 RNA RESP QL NAA+PROBE: NEGATIVE

## 2021-12-06 PROCEDURE — U0003 INFECTIOUS AGENT DETECTION BY NUCLEIC ACID (DNA OR RNA); SEVERE ACUTE RESPIRATORY SYNDROME CORONAVIRUS 2 (SARS-COV-2) (CORONAVIRUS DISEASE [COVID-19]), AMPLIFIED PROBE TECHNIQUE, MAKING USE OF HIGH THROUGHPUT TECHNOLOGIES AS DESCRIBED BY CMS-2020-01-R: HCPCS

## 2021-12-09 ENCOUNTER — HOSPITAL ENCOUNTER (OUTPATIENT)
Dept: GENERAL RADIOLOGY | Facility: CLINIC | Age: 26
End: 2021-12-09
Attending: FAMILY MEDICINE
Payer: COMMERCIAL

## 2021-12-09 ENCOUNTER — HOSPITAL ENCOUNTER (OUTPATIENT)
Dept: MRI IMAGING | Facility: CLINIC | Age: 26
End: 2021-12-09
Attending: FAMILY MEDICINE
Payer: COMMERCIAL

## 2021-12-09 VITALS — DIASTOLIC BLOOD PRESSURE: 61 MMHG | OXYGEN SATURATION: 99 % | HEART RATE: 90 BPM | SYSTOLIC BLOOD PRESSURE: 108 MMHG

## 2021-12-09 DIAGNOSIS — M79.18 MYOFASCIAL PAIN: ICD-10-CM

## 2021-12-09 DIAGNOSIS — G89.29 CHRONIC LEFT SHOULDER PAIN: ICD-10-CM

## 2021-12-09 DIAGNOSIS — M25.512 CHRONIC LEFT SHOULDER PAIN: ICD-10-CM

## 2021-12-09 PROCEDURE — 23350 INJECTION FOR SHOULDER X-RAY: CPT

## 2021-12-09 PROCEDURE — 250N000011 HC RX IP 250 OP 636: Performed by: FAMILY MEDICINE

## 2021-12-09 PROCEDURE — A9585 GADOBUTROL INJECTION: HCPCS | Performed by: FAMILY MEDICINE

## 2021-12-09 PROCEDURE — 255N000002 HC RX 255 OP 636: Performed by: FAMILY MEDICINE

## 2021-12-09 PROCEDURE — 73222 MRI JOINT UPR EXTREM W/DYE: CPT | Mod: LT

## 2021-12-09 PROCEDURE — 250N000009 HC RX 250: Performed by: FAMILY MEDICINE

## 2021-12-09 RX ORDER — GADOBUTROL 604.72 MG/ML
0.1 INJECTION INTRAVENOUS ONCE
Status: COMPLETED | OUTPATIENT
Start: 2021-12-09 | End: 2021-12-09

## 2021-12-09 RX ORDER — EPINEPHRINE 1 MG/ML
1 INJECTION, SOLUTION, CONCENTRATE INTRAVENOUS ONCE
Status: COMPLETED | OUTPATIENT
Start: 2021-12-09 | End: 2021-12-09

## 2021-12-09 RX ORDER — LIDOCAINE HYDROCHLORIDE 10 MG/ML
30 INJECTION, SOLUTION EPIDURAL; INFILTRATION; INTRACAUDAL; PERINEURAL ONCE
Status: COMPLETED | OUTPATIENT
Start: 2021-12-09 | End: 2021-12-09

## 2021-12-09 RX ORDER — IOPAMIDOL 408 MG/ML
10 INJECTION, SOLUTION INTRATHECAL ONCE
Status: COMPLETED | OUTPATIENT
Start: 2021-12-09 | End: 2021-12-09

## 2021-12-09 RX ADMIN — GADOBUTROL 0.1 ML: 604.72 INJECTION INTRAVENOUS at 10:21

## 2021-12-09 RX ADMIN — EPINEPHRINE 0.1 MG: 1 INJECTION, SOLUTION, CONCENTRATE INTRAVENOUS at 10:22

## 2021-12-09 RX ADMIN — LIDOCAINE HYDROCHLORIDE 3 ML: 10 INJECTION, SOLUTION EPIDURAL; INFILTRATION; INTRACAUDAL; PERINEURAL at 10:18

## 2021-12-09 RX ADMIN — IOPAMIDOL 1 ML: 408 INJECTION, SOLUTION INTRATHECAL at 10:21

## 2021-12-09 NOTE — DISCHARGE INSTRUCTIONS
JOINT ARTHROGRAM DISCHARGE INSTRUCTIONS    What to expect    Your joint may feel a little sore for the next day or two, but many people don't feel any different.     You should receive the imaging results from the provider who ordered this study within a few days.   What to do    Minimize your activity today. You may resume your normal activity tomorrow as tolerated.     You may shower tomorrow, but do not submerge in bathtub, hot tub or pool for 48 hours.      Use ice packs as needed for discomfort.    You may resume all medications, including blood thinners.    You may take over the counter acetaminophen (Tylenol) or ibuprofen (Motrin, Advil) for mild discomfort after the procedure.    What to watch for    Bruising or slight swelling at the puncture site can be normal. If you begin to develop redness or excessive swelling at the site, fever over 1010F, notable increase in pain, weakness, or numbness, please contact your primary care or referring provider.  If you have questions or concerns    You may contact the [facility] Department at [phone] between 8am-4:30pm Monday through Friday.    If you have urgent questions outside of these normal business hours, please contact the [facility] on-call physician at [phone].    The provider who performed your procedure was Paras Bates PA-C

## 2021-12-09 NOTE — PROCEDURES
[facility]    Procedure: Left shoulder gadolinium injection for MRI arthrogram    Date/Time: 12/9/2021 10:35 AM  Performed by: Iman Negrete PA-C  Authorized by: Iman Negrete PA-C       UNIVERSAL PROTOCOL   Site Marked: Yes  Prior Images Obtained and Reviewed:  Yes  Required items: Required blood products, implants, devices and special equipment available    Patient identity confirmed:  Verbally with patient  NA - No sedation, light sedation, or local anesthesia  Confirmation Checklist:  Patient's identity using two indicators, relevant allergies, procedure was appropriate and matched the consent or emergent situation and correct equipment/implants were available  Time out: Immediately prior to the procedure a time out was called    Universal Protocol: the Joint Commission Universal Protocol was followed    Preparation: Patient was prepped and draped in usual sterile fashion       ANESTHESIA    Anesthesia: Local infiltration  Local Anesthetic:  Lidocaine 1% without epinephrine      SEDATION    Patient Sedated: No    See dictated procedure note for full details.    PROCEDURE  Describe Procedure: 12 mL of dilute gadolinium solution injection  Patient Tolerance:  Patient tolerated the procedure well with no immediate complications  Length of time physician/provider present for 1:1 monitoring during sedation: 0

## 2021-12-29 PROBLEM — G89.29 CHRONIC LEFT SHOULDER PAIN: Status: RESOLVED | Noted: 2021-11-03 | Resolved: 2021-12-29

## 2021-12-29 PROBLEM — M25.512 CHRONIC LEFT SHOULDER PAIN: Status: RESOLVED | Noted: 2021-11-03 | Resolved: 2021-12-29

## 2021-12-29 NOTE — PROGRESS NOTES
Discharge Note    Progress reporting period is from initial evaluation date (please see noted date below) to Nov 19, 2021.  No linked episodes      Tyrone failed to follow up and current status is unknown.  Please see information below for last relevant information on current status.  Patient seen for 3 visits.    SUBJECTIVE  Subjective changes noted by patient:  pt reports that hes been doing exercises and feels like they hurt later in the day afterwards. Getting shoulder imaging done next week   .  Current pain level is 6/10.     Previous pain level was  4/10.   Changes in function:  Yes (See Goal flowsheet attached for changes in current functional level)  Adverse reaction to treatment or activity: None    OBJECTIVE  Changes noted in objective findings: trigger points in L calf, introduced STM and US today     ASSESSMENT/PLAN  Diagnosis: L shoulder pain, L ankle pain   Updated problem list and treatment plan:   Pain - HEP  Decreased ROM/flexibility - HEP  Decreased function - HEP  Decreased strength - HEP  Impaired muscle performance - HEP   Decreased joint mobility - HEP  STG/LTGs have been met or progress has been made towards goals:  Yes, please see goal flowsheet for most current information  Assessment of Progress: current status is unknown.    Last current status: Pt is progressing slower than anticipated   Self Management Plans:  HEP  I have re-evaluated this patient and find that the nature, scope, duration and intensity of the therapy is appropriate for the medical condition of the patient.  Tyrone continues to require the following intervention to meet STG and LTG's:  HEP.    Recommendations:  Discharge with current home program.  Patient to follow up with MD as needed.    Please refer to the daily flowsheet for treatment today, total treatment time and time spent performing 1:1 timed codes.

## 2022-01-18 VITALS
OXYGEN SATURATION: 99 % | BODY MASS INDEX: 24.93 KG/M2 | DIASTOLIC BLOOD PRESSURE: 58 MMHG | SYSTOLIC BLOOD PRESSURE: 94 MMHG | HEIGHT: 68 IN | HEART RATE: 80 BPM | WEIGHT: 164.5 LBS

## 2022-02-19 ENCOUNTER — HEALTH MAINTENANCE LETTER (OUTPATIENT)
Age: 27
End: 2022-02-19

## 2022-05-19 ENCOUNTER — OFFICE VISIT (OUTPATIENT)
Dept: URGENT CARE | Facility: URGENT CARE | Age: 27
End: 2022-05-19
Payer: COMMERCIAL

## 2022-05-19 VITALS
RESPIRATION RATE: 20 BRPM | TEMPERATURE: 98 F | OXYGEN SATURATION: 98 % | DIASTOLIC BLOOD PRESSURE: 78 MMHG | SYSTOLIC BLOOD PRESSURE: 128 MMHG | HEART RATE: 78 BPM

## 2022-05-19 DIAGNOSIS — J02.9 SORE THROAT: Primary | ICD-10-CM

## 2022-05-19 DIAGNOSIS — J06.9 VIRAL URI: ICD-10-CM

## 2022-05-19 LAB — DEPRECATED S PYO AG THROAT QL EIA: NEGATIVE

## 2022-05-19 PROCEDURE — U0005 INFEC AGEN DETEC AMPLI PROBE: HCPCS | Performed by: FAMILY MEDICINE

## 2022-05-19 PROCEDURE — 99213 OFFICE O/P EST LOW 20 MIN: CPT | Performed by: FAMILY MEDICINE

## 2022-05-19 PROCEDURE — 87651 STREP A DNA AMP PROBE: CPT | Performed by: FAMILY MEDICINE

## 2022-05-19 PROCEDURE — U0003 INFECTIOUS AGENT DETECTION BY NUCLEIC ACID (DNA OR RNA); SEVERE ACUTE RESPIRATORY SYNDROME CORONAVIRUS 2 (SARS-COV-2) (CORONAVIRUS DISEASE [COVID-19]), AMPLIFIED PROBE TECHNIQUE, MAKING USE OF HIGH THROUGHPUT TECHNOLOGIES AS DESCRIBED BY CMS-2020-01-R: HCPCS | Performed by: FAMILY MEDICINE

## 2022-05-20 LAB
GROUP A STREP BY PCR: NOT DETECTED
SARS-COV-2 RNA RESP QL NAA+PROBE: NEGATIVE

## 2022-05-21 NOTE — PROGRESS NOTES
ASSESSMENT/ PLAN:    Sore throat     - Symptomatic; Unknown COVID-19 Virus (Coronavirus) by PCR Nose  - Streptococcus A Rapid Screen w/Reflex to PCR  - Group A Streptococcus PCR Throat Swab    Viral URI      We discussed that based on the patient's description and physical exam, that a respiratory virus is the most likely cause of the the current illness.  Treatment is symptomatic with OTC cold remedies, acetaminophen, ibuprofen for pain and fever     Symptomatic measures encouraged, humidified air, plenty of fluids.  Patient may consider OTC expectorant and/or cough suppressant to treat symptoms.  Return if worsening    ------------------------------------------------------------------------------------------------------------------------------------------------    SUBJECTIVE:  Chief Complaint   Patient presents with     Pharyngitis     Sore throat      Tyrone Whitfield is a 26 year old male who presents to the clinic today with a chief complaint of sore throat, tender lymph nodes in the neck,  little cough for 1 day(s).  Patient denies shortness of breath., central chest pain., pleuritic chest pain and wheezing.  His cough is described as occasional.    The patient's symptoms are moderate and worsening.  Associated symptoms include sore throat. The patient's symptoms are exacerbated by no particular triggers  Patient has been using nothing  to improve symptoms.    Past Medical History:   Diagnosis Date     MDD (major depressive disorder)      Tourette's disorder      Patient Active Problem List   Diagnosis     Tourette's disorder     MDD (major depressive disorder)       ALLERGIES:  Patient has no known allergies.    MEDs  buPROPion (WELLBUTRIN SR) 150 MG 12 hr tablet, TK 1 T PO QAM  methocarbamol (ROBAXIN) 500 MG tablet, Take 1 tablet (500 mg) by mouth 4 times daily as needed for muscle spasms  Multiple Vitamins-Minerals (MULTIVITAMIN & MINERAL PO), Take 1 tablet by mouth daily  gabapentin (NEURONTIN) 300 MG  capsule, Take 1 capsule (300 mg) by mouth At Bedtime (Patient not taking: No sig reported)    No current facility-administered medications on file prior to visit.      Social History     Tobacco Use     Smoking status: Never Smoker     Smokeless tobacco: Never Used   Substance Use Topics     Alcohol use: Yes       Family History   Adopted: Yes   Problem Relation Age of Onset     Lung Cancer Maternal Grandmother         smoker     Leukemia Paternal Grandfather      Hypertension Father      Cholesteatoma Father      Pacemaker Paternal Uncle      Psoriasis Mother      Diabetes No family hx of      Myocardial Infarction No family hx of      Cerebrovascular Disease No family hx of      Prostate Cancer No family hx of      Colon Cancer No family hx of          ROS  CONSTITUTIONAL:NEGATIVE for fever, chills   INTEGUMENTARY/SKIN: NEGATIVE for worrisome rashes,  or lesions  EYES: NEGATIVE for vision changes or irritation  RESP:NEGATIVE for significant cough or SOB  GI: NEGATIVE for nausea, abdominal pain,   or change in bowel habits    OBJECTIVE:  /78   Pulse 78   Temp 98  F (36.7  C)   Resp 20   SpO2 98%   GENERAL APPEARANCE: alert, mild distress and cooperative  EYES: EOMI,  PERRL, conjunctiva clear  HENT: ear canals and TM's normal.  Nose and mouth without ulcers,   or lesions,  Erythema of pharynx  NECK:  bilateral anterior cervical adenopathy  RESP: lungs clear to auscultation - no rales, rhonchi or wheezes  CV: regular rates and rhythm, normal S1 S2, no murmur noted   NEURO: Normal strength and tone, sensory exam grossly normal,  normal speech and mentation  SKIN: no suspicious lesions or rashes    Results for orders placed or performed in visit on 05/19/22   Streptococcus A Rapid Screen w/Reflex to PCR     Status: Normal    Specimen: Throat; Swab   Result Value Ref Range    Group A Strep antigen Negative Negative   Group A Streptococcus PCR Throat Swab     Status: Normal    Specimen: Throat; Swab   Result  Value Ref Range    Group A strep by PCR Not Detected Not Detected    Narrative    The Xpert Xpress Strep A test, performed on the [employer], is a rapid, qualitative in vitro diagnostic test for the detection of Streptococcus pyogenes (Group A ß-hemolytic Streptococcus, Strep A) in throat swab specimens from patients with signs and symptoms of pharyngitis. The Xpert Xpress Strep A test can be used as an aid in the diagnosis of Group A Streptococcal pharyngitis. The assay is not intended to monitor treatment for Group A Streptococcus infections. The Xpert Xpress Strep A test utilizes an automated real-time polymerase chain reaction (PCR) to detect Streptococcus pyogenes DNA.

## 2022-05-26 ENCOUNTER — OFFICE VISIT (OUTPATIENT)
Dept: URGENT CARE | Facility: URGENT CARE | Age: 27
End: 2022-05-26
Payer: COMMERCIAL

## 2022-05-26 VITALS
DIASTOLIC BLOOD PRESSURE: 63 MMHG | WEIGHT: 170 LBS | TEMPERATURE: 97.5 F | HEART RATE: 66 BPM | BODY MASS INDEX: 26.23 KG/M2 | OXYGEN SATURATION: 98 % | RESPIRATION RATE: 16 BRPM | SYSTOLIC BLOOD PRESSURE: 96 MMHG

## 2022-05-26 DIAGNOSIS — R53.83 FATIGUE, UNSPECIFIED TYPE: ICD-10-CM
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
## 2022-05-26 DIAGNOSIS — R59.9 ENLARGED LYMPH NODES: Primary | ICD-10-CM

## 2022-05-26 DIAGNOSIS — M25.50 MULTIPLE JOINT PAIN: ICD-10-CM

## 2022-05-26 DIAGNOSIS — S80.861A TICK BITE OF RIGHT LOWER LEG, INITIAL ENCOUNTER: ICD-10-CM

## 2022-05-26 LAB
BASOPHILS # BLD MANUAL: 0 10E3/UL (ref 0–0.2)
BASOPHILS NFR BLD MANUAL: 0 %
DEPRECATED S PYO AG THROAT QL EIA: NEGATIVE
EOSINOPHIL # BLD MANUAL: 0.2 10E3/UL (ref 0–0.7)
EOSINOPHIL NFR BLD MANUAL: 3 %
ERYTHROCYTE [DISTWIDTH] IN BLOOD BY AUTOMATED COUNT: 11.6 % (ref 10–15)
GROUP A STREP BY PCR: NOT DETECTED
HCT VFR BLD AUTO: 47.4 % (ref 40–53)
HGB BLD-MCNC: 15.7 G/DL (ref 13.3–17.7)
LYMPHOCYTES # BLD MANUAL: 2 10E3/UL (ref 0.8–5.3)
LYMPHOCYTES NFR BLD MANUAL: 40 %
MCH RBC QN AUTO: 31.2 PG (ref 26.5–33)
MCHC RBC AUTO-ENTMCNC: 33.1 G/DL (ref 31.5–36.5)
MCV RBC AUTO: 94 FL (ref 78–100)
METAMYELOCYTES # BLD MANUAL: 0.1 10E3/UL
METAMYELOCYTES NFR BLD MANUAL: 1 %
MONOCYTES # BLD MANUAL: 0.7 10E3/UL (ref 0–1.3)
MONOCYTES NFR BLD AUTO: NEGATIVE %
MONOCYTES NFR BLD MANUAL: 14 %
NEUTROPHILS # BLD MANUAL: 2.1 10E3/UL (ref 1.6–8.3)
NEUTROPHILS NFR BLD MANUAL: 42 %
PLAT MORPH BLD: ABNORMAL
PLATELET # BLD AUTO: 123 10E3/UL (ref 150–450)
RBC # BLD AUTO: 5.03 10E6/UL (ref 4.4–5.9)
RBC MORPH BLD: ABNORMAL
SMUDGE CELLS BLD QL SMEAR: PRESENT
WBC # BLD AUTO: 5.1 10E3/UL (ref 4–11)

## 2022-05-26 PROCEDURE — U0005 INFEC AGEN DETEC AMPLI PROBE: HCPCS | Performed by: NURSE PRACTITIONER

## 2022-05-26 PROCEDURE — U0003 INFECTIOUS AGENT DETECTION BY NUCLEIC ACID (DNA OR RNA); SEVERE ACUTE RESPIRATORY SYNDROME CORONAVIRUS 2 (SARS-COV-2) (CORONAVIRUS DISEASE [COVID-19]), AMPLIFIED PROBE TECHNIQUE, MAKING USE OF HIGH THROUGHPUT TECHNOLOGIES AS DESCRIBED BY CMS-2020-01-R: HCPCS | Performed by: NURSE PRACTITIONER

## 2022-05-26 PROCEDURE — 86308 HETEROPHILE ANTIBODY SCREEN: CPT | Performed by: NURSE PRACTITIONER

## 2022-05-26 PROCEDURE — 99214 OFFICE O/P EST MOD 30 MIN: CPT | Performed by: NURSE PRACTITIONER

## 2022-05-26 PROCEDURE — 36415 COLL VENOUS BLD VENIPUNCTURE: CPT | Performed by: NURSE PRACTITIONER

## 2022-05-26 PROCEDURE — 87651 STREP A DNA AMP PROBE: CPT | Performed by: NURSE PRACTITIONER

## 2022-05-26 PROCEDURE — 85027 COMPLETE CBC AUTOMATED: CPT | Performed by: NURSE PRACTITIONER

## 2022-05-26 PROCEDURE — 85007 BL SMEAR W/DIFF WBC COUNT: CPT | Performed by: NURSE PRACTITIONER

## 2022-05-26 RX ORDER — BUPROPION HYDROCHLORIDE 100 MG/1
TABLET, EXTENDED RELEASE ORAL
COMMUNITY
Start: 2022-02-03

## 2022-05-26 RX ORDER — ACETAMINOPHEN 500 MG
1000 TABLET ORAL ONCE
Status: COMPLETED | OUTPATIENT
Start: 2022-05-26 | End: 2022-05-26

## 2022-05-26 RX ORDER — DOXYCYCLINE 100 MG/1
100 TABLET ORAL 2 TIMES DAILY
Qty: 42 TABLET | Refills: 0 | Status: SHIPPED | OUTPATIENT
Start: 2022-05-26 | End: 2022-06-16

## 2022-05-26 RX ADMIN — Medication 1000 MG: at 13:40

## 2022-05-26 NOTE — PROGRESS NOTES
Chief Complaint   Patient presents with     Urgent Care     Pt has a R side lump on neck X 1 week. PT is also having a lot of fatigue and becoming hot/cold. PT was bit by a tick 2 weeks ago          ICD-10-CM    1. Enlarged lymph nodes  R59.9 Mononucleosis screen     CBC with platelets and differential     Symptomatic; Yes; 5/18/2022 COVID-19 Virus (Coronavirus) by PCR     Streptococcus A Rapid Scr w Reflx to PCR - Lab Collect     acetaminophen (TYLENOL) tablet 1,000 mg     Symptomatic; Yes; 5/18/2022 COVID-19 Virus (Coronavirus) by PCR Nose     Streptococcus A Rapid Scr w Reflx to PCR - Lab Collect     Mononucleosis screen     CBC with platelets and differential     Group A Streptococcus PCR Throat Swab     doxycycline monohydrate (ADOXA) 100 MG tablet   2. Fatigue, unspecified type  R53.83 Mononucleosis screen     CBC with platelets and differential     Symptomatic; Yes; 5/18/2022 COVID-19 Virus (Coronavirus) by PCR     Symptomatic; Yes; 5/18/2022 COVID-19 Virus (Coronavirus) by PCR Nose     Mononucleosis screen     CBC with platelets and differential     doxycycline monohydrate (ADOXA) 100 MG tablet   3. Multiple joint pain  M25.50 CBC with platelets and differential     Symptomatic; Yes; 5/18/2022 COVID-19 Virus (Coronavirus) by PCR     acetaminophen (TYLENOL) tablet 1,000 mg     Symptomatic; Yes; 5/18/2022 COVID-19 Virus (Coronavirus) by PCR Nose     CBC with platelets and differential     doxycycline monohydrate (ADOXA) 100 MG tablet   4. Tick bite of right lower leg, initial encounter  S80.861A doxycycline monohydrate (ADOXA) 100 MG tablet    W57.XXXA    Will treat with doxycycline for extended course given the level of patient's symptoms and probability this was a deer tick.  Instructed patient to follow-up with primary care provider if symptoms fail to improve in the next week.  Rest and drink plenty of fluids.    35 minutes spent on the date of the encounter doing chart review, history and exam,  documentation and further activities per the note      Results for orders placed or performed in visit on 05/26/22 (from the past 24 hour(s))   Streptococcus A Rapid Scr w Reflx to PCR - Lab Collect    Specimen: Throat; Swab   Result Value Ref Range    Group A Strep antigen Negative Negative   Mononucleosis screen   Result Value Ref Range    Mononucleosis Screen Negative Negative   CBC with platelets and differential    Narrative    The following orders were created for panel order CBC with platelets and differential.  Procedure                               Abnormality         Status                     ---------                               -----------         ------                     CBC with platelets and d...[[id number]]                      In process                   Please view results for these tests on the individual orders.       Subjective     Tyrone Whitfield is an 26 year old male who presents to clinic today for swollen lymph node on the right side of his neck for 8 days.  He also has body and joint aches as well as severe fatigue.  He was bitten by a tick approximately 2 weeks ago on the right lower leg.  He has never noticed a rash but does offer some concerns for Lyme's disease.  He has been vaccinated for COVID-19 with booster obtained in November 2021.    ROS: 10 point ROS neg other than the symptoms noted above in the HPI.       Objective    BP 96/63   Pulse 66   Temp 97.5  F (36.4  C) (Tympanic)   Resp 16   Wt 77.1 kg (170 lb)   SpO2 98%   BMI 26.23 kg/m    Nurses notes and VS have been reviewed.    Physical Exam       GENERAL APPEARANCE: healthy appearing, alert     EYES: PERRL, EOMI, sclera non-icteric     HENT: ear canals and TM's normal, nose and mouth without ulcers or lesions and erythema of pharynx     NECK: Large tender nodes on the right anterior neck     RESP: lungs clear to auscultation - no rales, rhonchi or wheezes     CV: regular rates and rhythm, no murmurs, rubs, or  gallop     MS: extremities normal- no gross deformities noted; normal muscle tone.     SKIN: no suspicious lesions or rashes     NEURO: Normal strength and tone, mentation intact and speech normal     PSYCH: normal thought process; no significant mood disturbance    Patient Instructions   Take all antibiotics until gone.    Follow-up with primary care provider in 2 weeks if symptoms have not resolved, sooner if symptoms worsen.      SAMANTHA Beasley, CNP  [facility] Provider    The use of Dragon/[employer] dictation services may have been used to construct the content in this note; any grammatical or spelling errors are non-intentional. Please contact the author of this note directly if you are in need of any clarification.

## 2022-05-26 NOTE — PATIENT INSTRUCTIONS
Take all antibiotics until gone.    Follow-up with primary care provider in 2 weeks if symptoms have not resolved, sooner if symptoms worsen.

## 2022-05-26 NOTE — LETTER
May 26, 2022      Tyrone Whitfield  [address] [address] [facility] [address]        To Whom It May Concern:    Tyrone Whitfield  was seen on 5/26/2022.  Please excuse him  until 5/30/2022 due to illness.  It is important that he stay home and rests.        Sincerely,        Luisa Quintero, CNP

## 2022-05-26 NOTE — PROGRESS NOTES
Clinic Administered Medication Documentation    Administrations This Visit     acetaminophen (TYLENOL) tablet 1,000 mg     Admin Date  05/26/2022 Action  Given Dose  1,000 mg Route  Oral Site   Administered By  Claudia Fernandez    Ordering Provider: Luisa Quintero CNP    Patient Supplied?: No                Oral Medication Documentation    Patient was given Acetaminophen. Prior to medication administration, verified patients identity using patient s name and date of birth. Please see MAR and medication order for additional information.     Was entire amount of medication used? Yes  Expiration Date: 05/2023    GINGER Crowley

## 2022-05-27 LAB — SARS-COV-2 RNA RESP QL NAA+PROBE: NEGATIVE

## 2022-10-22 ENCOUNTER — HEALTH MAINTENANCE LETTER (OUTPATIENT)
Age: 27
End: 2022-10-22

## 2022-12-13 ENCOUNTER — OFFICE VISIT (OUTPATIENT)
Dept: URGENT CARE | Facility: URGENT CARE | Age: 27
End: 2022-12-13
Payer: COMMERCIAL

## 2022-12-13 VITALS
TEMPERATURE: 98 F | DIASTOLIC BLOOD PRESSURE: 69 MMHG | RESPIRATION RATE: 20 BRPM | OXYGEN SATURATION: 98 % | HEART RATE: 80 BPM | SYSTOLIC BLOOD PRESSURE: 114 MMHG

## 2022-12-13 DIAGNOSIS — S39.012A ACUTE MYOFASCIAL STRAIN OF LUMBAR REGION, INITIAL ENCOUNTER: Primary | ICD-10-CM

## 2022-12-13 PROCEDURE — 99213 OFFICE O/P EST LOW 20 MIN: CPT | Performed by: INTERNAL MEDICINE

## 2022-12-13 RX ORDER — CYCLOBENZAPRINE HCL 5 MG
5-10 TABLET ORAL 3 TIMES DAILY PRN
Qty: 30 TABLET | Refills: 0 | Status: SHIPPED | OUTPATIENT
Start: 2022-12-13 | End: 2024-05-23

## 2022-12-13 NOTE — PATIENT INSTRUCTIONS
Use ibuprofen 800 mg three times daily with food for the next several days.  Can also use acetaminophen 1,000 mg as needed in addition to the ibuprofen.    Muscle relaxer as needed.

## 2022-12-13 NOTE — PROGRESS NOTES
"  Assessment & Plan     Acute myofascial strain of lumbar region, initial encounter  See patient instructions.   This looks like a fairly severe strain with some significant tearing of muscle fibers given the hematoma of the lower back. Could take a while to heal completely.  - cyclobenzaprine (FLEXERIL) 5 MG tablet; Take 1-2 tablets (5-10 mg) by mouth 3 times daily as needed for muscle spasms    Bigg Mcneal MD  [facility] JANETH Coffey is a 27 year old, presenting for the following health issues:  Back Pain (Back pain pt was lifting weights )      HPI   Chief complaint of back pain.  He was doing some power cleaning and he felt a \"tug\" that is now a sense of stiffness in the lower back.  Pain is currently radiating across the lower back, left > right, just above the SI region. Uncomfortable when sitting, better when supine. No radiation into the legs.  Denies prior history of back injuries.     Review of Systems   Constitutional, HEENT, cardiovascular, pulmonary, gi and gu systems are negative, except as otherwise noted.      Objective    /69   Pulse 80   Temp 98  F (36.7  C)   Resp 20   SpO2 98%   There is no height or weight on file to calculate BMI.  Physical Exam   GENERAL APPEARANCE: healthy, alert and no distress  M/SKEL: there is not bony midline tenderness over the lumbar spine; there is significant tenderness to palpation in the left lumbar paraspinal muscles with an associated spongy region, warmth and erythema correlating with muscle hematoma; there is pain with any movement of the lumbar spine; Straight Leg Raise is negative on the bilateral  NEURO: Strength:           Hip Flexors 5/5 Right, 5/5 Left -           Hip Extensors 5/5 Right, 5/5 Left -           Knee Flexors 5/5 Right, 5/5 Left -           Knee Extensors 5/5 Right, 5/5 Left -           Plantarflexors 5/5 Right, 5/5 Left -           Dorsiflexors 5/5 Right, 5/5 Left -  Reflexes:          Patellar " 2+ Right, 2+ Left          Achilles 2+ Right, 2+ Left  Sensation:         intact throughout

## 2023-02-21 ENCOUNTER — APPOINTMENT (OUTPATIENT)
Dept: GENERAL RADIOLOGY | Facility: CLINIC | Age: 28
End: 2023-02-21
Attending: EMERGENCY MEDICINE
Payer: COMMERCIAL

## 2023-02-21 PROCEDURE — 24650 CLTX RDL HEAD/NCK FX WO MNPJ: CPT | Mod: LT

## 2023-02-21 PROCEDURE — 99284 EMERGENCY DEPT VISIT MOD MDM: CPT | Mod: 25

## 2023-02-21 PROCEDURE — 73080 X-RAY EXAM OF ELBOW: CPT | Mod: LT

## 2023-02-21 PROCEDURE — 250N000013 HC RX MED GY IP 250 OP 250 PS 637: Performed by: EMERGENCY MEDICINE

## 2023-02-21 RX ORDER — ACETAMINOPHEN 500 MG
1000 TABLET ORAL ONCE
Status: COMPLETED | OUTPATIENT
Start: 2023-02-21 | End: 2023-02-21

## 2023-02-21 RX ADMIN — ACETAMINOPHEN 1000 MG: 500 TABLET ORAL at 22:24

## 2023-02-22 ENCOUNTER — HOSPITAL ENCOUNTER (EMERGENCY)
Facility: CLINIC | Age: 28
Discharge: HOME | End: 2023-02-22
Attending: EMERGENCY MEDICINE | Admitting: EMERGENCY MEDICINE
Payer: COMMERCIAL

## 2023-02-22 VITALS
DIASTOLIC BLOOD PRESSURE: 77 MMHG | RESPIRATION RATE: 18 BRPM | OXYGEN SATURATION: 99 % | TEMPERATURE: 98 F | HEART RATE: 85 BPM | SYSTOLIC BLOOD PRESSURE: 110 MMHG

## 2023-02-22 DIAGNOSIS — S52.123A RADIAL HEAD FRACTURE: ICD-10-CM

## 2023-02-22 PROCEDURE — 250N000013 HC RX MED GY IP 250 OP 250 PS 637: Performed by: EMERGENCY MEDICINE

## 2023-02-22 RX ORDER — HYDROCODONE BITARTRATE AND ACETAMINOPHEN 5; 325 MG/1; MG/1
1 TABLET ORAL EVERY 4 HOURS PRN
Qty: 10 TABLET | Refills: 0 | Status: SHIPPED | OUTPATIENT
Start: 2023-02-22 | End: 2024-05-23

## 2023-02-22 RX ORDER — OXYCODONE HYDROCHLORIDE 5 MG/1
5 TABLET ORAL ONCE
Status: COMPLETED | OUTPATIENT
Start: 2023-02-22 | End: 2023-02-22

## 2023-02-22 RX ADMIN — OXYCODONE HYDROCHLORIDE 5 MG: 5 TABLET ORAL at 00:58

## 2023-02-22 NOTE — ED TRIAGE NOTES
Fell while playing football landed on his left elbow and heard a crunch. Now having swelling and pain to left elbow. ABCs intact.      Triage Assessment     Row Name 02/21/23 0827       Triage Assessment (Adult)    Airway WDL WDL       Respiratory WDL    Respiratory WDL WDL       Cardiac WDL    Cardiac WDL WDL

## 2023-02-22 NOTE — ED PROVIDER NOTES
Visit Date: 02/22/2023    CHIEF COMPLAINT:  Left elbow pain.    HISTORY OF PRESENT ILLNESS:  This is a 27-year-old gentleman who was playing flag football earlier today.  He fell to the ground on an outstretched left hand.  He heard a crunching sound in his left elbow and now has pain with any attempts of movement.  He specifically states that supination makes it worse.  He denies any numbness.  No head injury.  No other complaints at this time.     INDEPENDENT HISTORIAN: None.      REVIEW OF MEDICAL RECORD:  I reviewed Care Everywhere and updated the patient's history in [facility].    PAST MEDICAL HISTORY:    1.  Tourette's disorder.  2.  Depression.    PAST SURGICAL HISTORY:  Tonsillectomy and adenoidectomy.    MEDICATIONS:    1.  Flexeril.  2.  Wellbutrin.  3.  Multivitamin.    ALLERGIES:  NONE.    SOCIAL HISTORY:  The patient presents to the emergency department by himself.  He does not smoke.    REVIEW OF SYSTEMS:  Pertinent 10-point review of systems is negative except for that noted in the HPI.    PHYSICAL EXAMINATION:    GENERAL:  The patient is sitting comfortably in a chair in triage.  VITAL SIGNS:  Blood pressure 136/77, heart rate 79, respiratory rate 18, oxygen 97% on room air, temperature 98 degrees.  CARDIOVASCULAR:  Regular rhythm. Normal rate.  2+ left radial pulse.  RESPIRATORY:  Normal effort.  SKIN:  No pertinent skin findings.  NEUROLOGIC:  The patient is alert, oriented x 4.  Strength and sensation in his distal left radial, ulnar, median nerves is normal.  MUSCULOSKELETAL:  Pain with any range of motion of the left elbow.  He has tenderness over the radial head.  No obvious joint effusion.  He otherwise has full range of motion of his left wrist and shoulder.  PSYCHIATRIC:  The patient has normal mood and affect.    LABORATORY EVALUATION AND DIAGNOSTIC STUDIES:  Plain x-ray of the left elbow shows a prominent joint effusion causing positive fat pad signs.  There is a subtle irregularity seen  involving the lateral aspect of the radial head consistent with a fracture showing minimal displacement.  The rest of the elbow was interpreted by radiology as negative.      Social determinants of health: None.      INDEPENDENT REVIEW OF IMAGING:  I independently reviewed the patient's x-ray.  Agree with positive fat pad signs and probable radial head fracture.    EMERGENCY DEPARTMENT COURSE AND MEDICAL DECISION MAKING:  This is a 27-year-old gentleman who presents with a fall on an outstretched left hand.  His pain localizes to his left elbow.  Given the fat pad sign seen on x-ray with possible irregularity over the radial head, this will be consistent with a traumatic radial head fracture.  He is neurovascularly intact in the distal extremity.  Plan of care will be a sling for comfort, a prescription pain medicine and followup with Orthopedics this week.    DIAGNOSES:  Acute left radial head fracture.    PLAN OF CARE:  As above.    Andrew Garay MD        D: 2023   T: 2023   MT: CONSTANTINE    Name:     SHEREE MCLAIN  MRN:      [id number]        Account:    [id number]   :      1995           Visit Date: 2023     Document: [id number]

## 2023-02-24 ENCOUNTER — OFFICE VISIT (OUTPATIENT)
Dept: URGENT CARE | Facility: URGENT CARE | Age: 28
End: 2023-02-24
Payer: COMMERCIAL

## 2023-02-24 VITALS
HEART RATE: 66 BPM | TEMPERATURE: 98 F | SYSTOLIC BLOOD PRESSURE: 111 MMHG | RESPIRATION RATE: 16 BRPM | DIASTOLIC BLOOD PRESSURE: 72 MMHG | OXYGEN SATURATION: 99 %

## 2023-02-24 DIAGNOSIS — S52.125A CLOSED NONDISPLACED FRACTURE OF HEAD OF LEFT RADIUS, INITIAL ENCOUNTER: Primary | ICD-10-CM

## 2023-02-24 PROCEDURE — 99213 OFFICE O/P EST LOW 20 MIN: CPT | Mod: 25 | Performed by: PHYSICIAN ASSISTANT

## 2023-02-24 PROCEDURE — 96372 THER/PROPH/DIAG INJ SC/IM: CPT | Performed by: PHYSICIAN ASSISTANT

## 2023-02-24 RX ORDER — HYDROXYZINE HYDROCHLORIDE 25 MG/1
25 TABLET, FILM COATED ORAL EVERY 8 HOURS PRN
Qty: 30 TABLET | Refills: 0 | Status: SHIPPED | OUTPATIENT
Start: 2023-02-24 | End: 2023-03-06

## 2023-02-24 RX ORDER — KETOROLAC TROMETHAMINE 30 MG/ML
60 INJECTION, SOLUTION INTRAMUSCULAR; INTRAVENOUS ONCE
Status: COMPLETED | OUTPATIENT
Start: 2023-02-24 | End: 2023-02-24

## 2023-02-24 RX ADMIN — KETOROLAC TROMETHAMINE 60 MG: 30 INJECTION, SOLUTION INTRAMUSCULAR; INTRAVENOUS at 16:08

## 2023-02-24 NOTE — PATIENT INSTRUCTIONS
Toradol 60 mg in the clinic today for pain. Expect pain relief for 8 hours  Use hydroxyzine as needed for anxiety, sleep or as a adjuvant pain medication  Follow up with Orthopedics  I have placed a Orthopedics referral  Expect a call in a few days

## 2023-02-24 NOTE — PROGRESS NOTES
Assessment & Plan      1. Closed nondisplaced fracture of head of left radius, initial encounter  -Patient with left radial head fracture.  Patient was supposed to follow-up with [facility] orthopedics.  Patient did not follow-up as instructed by the ED provider.  Patient is requesting refill of Norco.  -Patient advised he needs to follow-up with [facility] orthopedics or [place] [facility].  Orthopedics referral completed.  -Patient advised we will not be refilling narcotics. He had the recommended prescription of 10 tablets.  -Toradol injection IM was given in the clinic.  Patient complained of right arm pain after injection.  Patient upset about the pain following Toradol injection.  -Hydroxyzine prescribed for sleep, anxiety and pain.    - ketorolac (TORADOL) injection 60 mg  - hydrOXYzine (ATARAX) 25 MG tablet; Take 1 tablet (25 mg) by mouth every 8 hours as needed for anxiety or other (sleep and pain)  Dispense: 30 tablet; Refill: 0  - Orthopedic  Referral; Future      Patient Instructions   Toradol 60 mg in the clinic today for pain. Expect pain relief for 8 hours  Use hydroxyzine as needed for anxiety, sleep or as a adjuvant pain medication  Follow up with Orthopedics  I have placed a Orthopedics referral  Expect a call in a few days        Return if symptoms worsen or fail to improve, for Follow up, PCP.    At the end of the encounter, I discussed results, diagnosis, medications. Discussed red flags for immediate return to clinic/ER, as well as indications for follow up if no improvement. Patient understood and agreed to plan. Patient was stable for discharge.    Subjective     Don is a 27 year old male who presents to clinic today  for the following health issues:  Chief Complaint   Patient presents with     Medication Request     Seen in ER 2/22 for left elbow fracture, still in a lot pain-doesn't have pcp unable to f/u     HPI  Patient reports left elbow fracture.  He fell onto outstretched  left arm while playing flag football 2 days ago.  He was seen in the ED on where Xray radial head fracture. Patient was prescribed Norco (5-325 mg), 10 tablets. He was advised to take the medication every 4 hours as needed for pain. He was also put in a sling.  Patient was advised to follow-up with [facility] orthopedics by February 24, 2023.  Patient reports he was not given information about whom to follow-up with. He he reports worsening elbow pain, and is requesting refills for Norco.  He does not have a PCP.  Patient reports he cannot use his left arm.  He rates pain at 8/10.  He is having a hard time sleeping, he is feeling anxious.  He has been taking ibuprofen which does not help. Denies loss of sensation.            Review of Systems    Problem List:  2021-11: Chronic left shoulder pain  2021-02: Right leg pain  2021-01: Muscle pain  2015-08: Cervicalgia  2015-08: Bilateral low back pain without sciatica  2015-07: Depression  2013-04: Eczema  2013-04: Feeling tired  2012-08: Tinea corporis  2012-04: Environmental allergies  2011-12: Upper back pain  2005-02: Tourette's disorder  2005-02: Attention deficit hyperactivity disorder (ADHD)  MDD (major depressive disorder)      Past Medical History:   Diagnosis Date     MDD (major depressive disorder)      Tourette's disorder        Social History     Tobacco Use     Smoking status: Never     Smokeless tobacco: Never   Substance Use Topics     Alcohol use: Yes           Objective    /72 (BP Location: Right arm)   Pulse 66   Temp 98  F (36.7  C)   Resp 16   SpO2 99%   Physical Exam  Vitals and nursing note reviewed.   Constitutional:       Appearance: Normal appearance.   Cardiovascular:      Rate and Rhythm: Normal rate and regular rhythm.      Pulses:           Radial pulses are 2+ on the right side and 2+ on the left side.   Pulmonary:      Effort: Pulmonary effort is normal.      Breath sounds: Normal breath sounds.   Musculoskeletal:          General: Normal range of motion.      Cervical back: Normal range of motion and neck supple.   Skin:     General: Skin is warm and dry.      Findings: No rash.   Neurological:      General: No focal deficit present.      Mental Status: He is alert and oriented to person, place, and time.      Sensory: Sensation is intact.      Gait: Gait is intact.      Comments: Motor function not assessed on the left arm due to injury   Psychiatric:         Mood and Affect: Mood is anxious.         Behavior: Behavior normal.              Deja Monahan PA-C

## 2023-03-08 ENCOUNTER — ANCILLARY PROCEDURE (OUTPATIENT)
Dept: GENERAL RADIOLOGY | Facility: CLINIC | Age: 28
End: 2023-03-08
Attending: STUDENT IN AN ORGANIZED HEALTH CARE EDUCATION/TRAINING PROGRAM
Payer: COMMERCIAL

## 2023-03-08 ENCOUNTER — OFFICE VISIT (OUTPATIENT)
Dept: ORTHOPEDICS | Facility: CLINIC | Age: 28
End: 2023-03-08
Attending: PHYSICIAN ASSISTANT
Payer: COMMERCIAL

## 2023-03-08 VITALS — WEIGHT: 170 LBS | HEIGHT: 68 IN | BODY MASS INDEX: 25.76 KG/M2

## 2023-03-08 DIAGNOSIS — S52.125A CLOSED NONDISPLACED FRACTURE OF HEAD OF LEFT RADIUS, INITIAL ENCOUNTER: ICD-10-CM

## 2023-03-08 DIAGNOSIS — S52.135A CLOSED NONDISPLACED FRACTURE OF NECK OF LEFT RADIUS, INITIAL ENCOUNTER: ICD-10-CM

## 2023-03-08 PROCEDURE — 73080 X-RAY EXAM OF ELBOW: CPT | Mod: TC | Performed by: RADIOLOGY

## 2023-03-08 PROCEDURE — 99214 OFFICE O/P EST MOD 30 MIN: CPT | Performed by: STUDENT IN AN ORGANIZED HEALTH CARE EDUCATION/TRAINING PROGRAM

## 2023-03-08 NOTE — PATIENT INSTRUCTIONS
1. Closed nondisplaced fracture of neck of left radius, initial encounter      Tyrone Whitfield is a 27 year old right-handed male presenting for evaluation of acute left elbow pain after a fall 2 weeks ago. History, examination and imaging findings were reviewed today, consistent with non-displaced transverse fracture of the left radial neck.     Detailed plan:  - Ok to discontinue sling. May use as needed for comfort.   - Discussed activity modifications, avoiding activities with risk of fall/re-injury while the fracture is healing. No sports, gym or lifting.   - Non-weightbearing on left arm. No lifting, carrying, pushing/pulling.   - Ok to start working on gentle elbow range of motion (flexion, extension, pronation/supination). Guide all activity by pain.  - Heat or ice as needed.  - Discussed typical fracture healing course. Expect 6 weeks for fracture healing.   - Return/ED precautions discussed.  - Calcium recommendation: 1200 mg per day. Recommend looking into the IOF Calcium Calculator.  - Vitamin D recommendation: 2382-0399 international unit(s) per day.     - Follow up in 4 weeks for re-evaluation with new X-rays. Scheduled for April 5th at 2:00pm. Please arrive by 1:45pm.    You may call our direct clinic number ([phone]) at any time with questions or concerns.    Angélica Archibald MD, [facility]

## 2023-03-08 NOTE — LETTER
3/8/2023         RE: Tyrone Whitfield  [address] [address] [address]        Dear Colleague,    Thank you for referring your patient, Tyrone Whitfield, to the [facility] [place]. Please see a copy of my visit note below.    ASSESSMENT & PLAN  Patient Instructions     1. Closed nondisplaced fracture of neck of left radius, initial encounter      Tyrone Whitfield is a 27 year old right-handed male presenting for evaluation of acute left elbow pain after a fall 2 weeks ago. History, examination and imaging findings were reviewed today, consistent with non-displaced transverse fracture of the left radial neck.     Detailed plan:  - Ok to discontinue sling. May use as needed for comfort.   - Discussed activity modifications, avoiding activities with risk of fall/re-injury while the fracture is healing. No sports, gym or lifting.   - Non-weightbearing on left arm. No lifting, carrying, pushing/pulling.   - Ok to start working on gentle elbow range of motion (flexion, extension, pronation/supination). Guide all activity by pain.  - Heat or ice as needed.  - Discussed typical fracture healing course. Expect 6 weeks for fracture healing.   - Return/ED precautions discussed.  - Calcium recommendation: 1200 mg per day. Recommend looking into the IOF Calcium Calculator.  - Vitamin D recommendation: [id number] international unit(s) per day.     - Follow up in 4 weeks for re-evaluation with new X-rays. Scheduled for April 5th at 2:00pm. Please arrive by 1:45pm.    You may call our direct clinic number ([phone]) at any time with questions or concerns.    Angélica Archibald MD, [facility]          -----    SUBJECTIVE  Tyrone Whitfield is a/an 27 year old Right handed male who is seen in consultation at the request of  Deja Monahan PA-C for evaluation of left elbow pain. The patient is seen by themselves.    Onset: 2 week(s) ago -  "2/21/23. Patient describes injury as he fell playing flag football and landed on outstretched left hand.  Location of Pain: left posterior elbow with pain radiating to forearm  Rating of Pain at worst: 8/10  Rating of Pain Currently: 2/10  Worsened by:  / grasp, pronation / supination, elbow extension / flexion  Better with: rest / activity avoidance  Treatments tried: rest/activity avoidance, Tylenol, ibuprofen, Aleve, other medications: Hydrocodone/Acetaminophen (Vicodin/Norco) and Oxycodone/Acetaminophen (Percocet), previous imaging (xray 2/22/23) and sling  Associated symptoms: pressure in left elbow / forearm; weakness of left hand / arm  Orthopedic history: NO  Relevant surgical history: NO  Social history: works - self employed as a     Past Medical History:   Diagnosis Date     MDD (major depressive disorder)      Tourette's disorder      Social History     Socioeconomic History     Marital status: Single     Number of children: 0   Occupational History     Occupation:    Tobacco Use     Smoking status: Never     Smokeless tobacco: Never   Substance and Sexual Activity     Alcohol use: Yes     Drug use: Never     Sexual activity: Yes   Social History Narrative    In a relationship.    No kids.    Regular exercise.        Patient's past medical, surgical, social, and family histories were reviewed today and no changes are noted.    REVIEW OF SYSTEMS:  10 point ROS is negative other than symptoms noted above in HPI, Past Medical History or as stated below  Constitutional: NEGATIVE for fever, chills, change in weight  Skin: NEGATIVE for worrisome rashes, moles or lesions  GI/: NEGATIVE for bowel or bladder changes  Neuro: NEGATIVE for weakness, dizziness or paresthesias    OBJECTIVE:  Ht 1.727 m (5' 8\")   Wt 77.1 kg (170 lb)   BMI 25.85 kg/m     General: healthy, alert and in no distress  HEENT: no scleral icterus or conjunctival erythema  Skin: no suspicious lesions or " rash. No jaundice.  CV: regular rhythm by palpation  Resp: normal respiratory effort without conversational dyspnea   Psych: normal mood and affect  Gait: normal steady gait with appropriate coordination and balance  Neuro: Normal sensory exam of bilateral hands.   MSK:  LEFT ELBOW  Inspection:    No swelling, bruising, discoloration, or obvious deformity or asymmetry  Palpation:    Tender about the radial head/neck (mild). Remainder of bony, ligamentous and tendinous landmarks are nontender.    Crepitus is Absent  Range of Motion:     Extension limited by stiffness / flexion limited by stiffness / pronation full / supination limited by stiffness  Strength:    Deferred due to fracture    Independent visualization of the below image:  Recent Results (from the past 24 hour(s))   XR Elbow Left G/E 3 Views    Narrative    ELBOW THREE VIEWS LEFT  3/8/2023 2:31 PM     HISTORY: Closed nondisplaced fracture of head of left radius, initial  encounter  COMPARISON: 2/21/2023      Impression    IMPRESSION: Subtle radial neck fracture with similar alignment. There  is normal joint spacing and alignment. Persistent elbow joint  effusion.    SOLOMON ZAMBRANO MD         SYSTEM ID:  [id number]      Results for orders placed or performed during the hospital encounter of 02/22/23   Elbow  XR, G/E 3 views, left    Narrative    EXAM: XR ELBOW LEFT G/E 3 VIEWS  LOCATION: [facility]  DATE/TIME: 2/21/2023 10:49 PM    INDICATION: fell on left elbow  COMPARISON: None.        IMPRESSION: There is a prominent joint effusion causing positive fat pad signs. There is subtle irregularity seen involving the lateral aspect of the radial head consistent with a fracture showing minimal displacement. If indicated unenhanced CT may be   helpful for further definition. The left elbow is otherwise normal.       Angélica Archibald MD [facility]        Again, thank you for allowing me to  participate in the care of your patient.        Sincerely,        Angélica Archibald MD

## 2023-03-08 NOTE — PROGRESS NOTES
ASSESSMENT & PLAN  Patient Instructions     1. Closed nondisplaced fracture of neck of left radius, initial encounter      Tyrone Whitfield is a 27 year old right-handed male presenting for evaluation of acute left elbow pain after a fall 2 weeks ago. History, examination and imaging findings were reviewed today, consistent with non-displaced transverse fracture of the left radial neck.     Detailed plan:  - Ok to discontinue sling. May use as needed for comfort.   - Discussed activity modifications, avoiding activities with risk of fall/re-injury while the fracture is healing. No sports, gym or lifting.   - Non-weightbearing on left arm. No lifting, carrying, pushing/pulling.   - Ok to start working on gentle elbow range of motion (flexion, extension, pronation/supination). Guide all activity by pain.  - Heat or ice as needed.  - Discussed typical fracture healing course. Expect 6 weeks for fracture healing.   - Return/ED precautions discussed.  - Calcium recommendation: 1200 mg per day. Recommend looking into the IOF Calcium Calculator.  - Vitamin D recommendation: [id number] international unit(s) per day.     - Follow up in 4 weeks for re-evaluation with new X-rays. Scheduled for April 5th at 2:00pm. Please arrive by 1:45pm.    You may call our direct clinic number ([phone]) at any time with questions or concerns.    Angélica Archibald MD, [facility]          -----    SUBJECTIVE  Tyrone Whitfield is a/an 27 year old Right handed male who is seen in consultation at the request of  Deja Monahan PA-C for evaluation of left elbow pain. The patient is seen by themselves.    Onset: 2 week(s) ago - 2/21/23. Patient describes injury as he fell playing flag football and landed on outstretched left hand.  Location of Pain: left posterior elbow with pain radiating to forearm  Rating of Pain at worst: 8/10  Rating of Pain Currently: 2/10  Worsened by:  / grasp, pronation /  "supination, elbow extension / flexion  Better with: rest / activity avoidance  Treatments tried: rest/activity avoidance, Tylenol, ibuprofen, Aleve, other medications: Hydrocodone/Acetaminophen (Vicodin/Norco) and Oxycodone/Acetaminophen (Percocet), previous imaging (xray 2/22/23) and sling  Associated symptoms: pressure in left elbow / forearm; weakness of left hand / arm  Orthopedic history: NO  Relevant surgical history: NO  Social history: works - self employed as a     Past Medical History:   Diagnosis Date     MDD (major depressive disorder)      Tourette's disorder      Social History     Socioeconomic History     Marital status: Single     Number of children: 0   Occupational History     Occupation:    Tobacco Use     Smoking status: Never     Smokeless tobacco: Never   Substance and Sexual Activity     Alcohol use: Yes     Drug use: Never     Sexual activity: Yes   Social History Narrative    In a relationship.    No kids.    Regular exercise.        Patient's past medical, surgical, social, and family histories were reviewed today and no changes are noted.    REVIEW OF SYSTEMS:  10 point ROS is negative other than symptoms noted above in HPI, Past Medical History or as stated below  Constitutional: NEGATIVE for fever, chills, change in weight  Skin: NEGATIVE for worrisome rashes, moles or lesions  GI/: NEGATIVE for bowel or bladder changes  Neuro: NEGATIVE for weakness, dizziness or paresthesias    OBJECTIVE:  Ht 1.727 m (5' 8\")   Wt 77.1 kg (170 lb)   BMI 25.85 kg/m     General: healthy, alert and in no distress  HEENT: no scleral icterus or conjunctival erythema  Skin: no suspicious lesions or rash. No jaundice.  CV: regular rhythm by palpation  Resp: normal respiratory effort without conversational dyspnea   Psych: normal mood and affect  Gait: normal steady gait with appropriate coordination and balance  Neuro: Normal sensory exam of bilateral hands.   MSK:  LEFT " ELBOW  Inspection:    No swelling, bruising, discoloration, or obvious deformity or asymmetry  Palpation:    Tender about the radial head/neck (mild). Remainder of bony, ligamentous and tendinous landmarks are nontender.    Crepitus is Absent  Range of Motion:     Extension limited by stiffness / flexion limited by stiffness / pronation full / supination limited by stiffness  Strength:    Deferred due to fracture    Independent visualization of the below image:  Recent Results (from the past 24 hour(s))   XR Elbow Left G/E 3 Views    Narrative    ELBOW THREE VIEWS LEFT  3/8/2023 2:31 PM     HISTORY: Closed nondisplaced fracture of head of left radius, initial  encounter  COMPARISON: 2/21/2023      Impression    IMPRESSION: Subtle radial neck fracture with similar alignment. There  is normal joint spacing and alignment. Persistent elbow joint  effusion.    SOLOMON ZAMBRANO MD         SYSTEM ID:  [id number]      Results for orders placed or performed during the hospital encounter of 02/22/23   Elbow  XR, G/E 3 views, left    Narrative    EXAM: XR ELBOW LEFT G/E 3 VIEWS  LOCATION: [facility]  DATE/TIME: 2/21/2023 10:49 PM    INDICATION: fell on left elbow  COMPARISON: None.        IMPRESSION: There is a prominent joint effusion causing positive fat pad signs. There is subtle irregularity seen involving the lateral aspect of the radial head consistent with a fracture showing minimal displacement. If indicated unenhanced CT may be   helpful for further definition. The left elbow is otherwise normal.       Angélica Archibald MD [facility]

## 2023-04-01 ENCOUNTER — HEALTH MAINTENANCE LETTER (OUTPATIENT)
Age: 28
End: 2023-04-01

## 2023-04-05 ENCOUNTER — OFFICE VISIT (OUTPATIENT)
Dept: ORTHOPEDICS | Facility: CLINIC | Age: 28
End: 2023-04-05
Payer: COMMERCIAL

## 2023-04-05 ENCOUNTER — ANCILLARY PROCEDURE (OUTPATIENT)
Dept: GENERAL RADIOLOGY | Facility: CLINIC | Age: 28
End: 2023-04-05
Attending: STUDENT IN AN ORGANIZED HEALTH CARE EDUCATION/TRAINING PROGRAM
Payer: COMMERCIAL

## 2023-04-05 VITALS — BODY MASS INDEX: 26.15 KG/M2 | WEIGHT: 172 LBS

## 2023-04-05 DIAGNOSIS — S52.135A CLOSED NONDISPLACED FRACTURE OF NECK OF LEFT RADIUS, INITIAL ENCOUNTER: ICD-10-CM

## 2023-04-05 DIAGNOSIS — S52.135D CLOSED NONDISPLACED FRACTURE OF NECK OF LEFT RADIUS WITH ROUTINE HEALING, SUBSEQUENT ENCOUNTER: Primary | ICD-10-CM

## 2023-04-05 PROCEDURE — 99214 OFFICE O/P EST MOD 30 MIN: CPT | Performed by: STUDENT IN AN ORGANIZED HEALTH CARE EDUCATION/TRAINING PROGRAM

## 2023-04-05 PROCEDURE — 73080 X-RAY EXAM OF ELBOW: CPT | Mod: TC | Performed by: RADIOLOGY

## 2023-04-05 NOTE — LETTER
4/5/2023         RE: Tyrone Whitfield  [address] [facility] [address]        Dear Colleague,    Thank you for referring your patient, Tyrone Whitfield, to the [facility] [place]. Please see a copy of my visit note below.    ASSESSMENT & PLAN    1. Closed nondisplaced fracture of neck of left radius with routine healing, subsequent encounter      Tyrone Whitfield is a 27 year old right-handed male presenting for follow up of acute left elbow pain after a fall approx 6 weeks ago. History, examination and imaging findings were reviewed today, consistent with non-displaced transverse fracture of the left radial neck. Repeat X-rays today show excellent interval fracture healing.     Detailed plan:  - Discussed home exercises to work on elbow range of motion. May gradually increase weightbearing on left arm as tolerated based on pain.  - Activity modifications: Avoid activities that provoke pain. Gradually increase activity as symptoms improve.   - Heat or ice as needed.  - Discussed typical fracture healing course.   - Return/ED precautions discussed.    - Follow up as needed if pain returns or new injury occurs.     You may call our direct clinic number ([phone]) at any time with questions or concerns.    Angélica Archibald MD, [facility]    -----    SUBJECTIVE:  Tyrone Whitfield is a 27 year old male who is seen in follow-up for left elbow pain due to injury ~ 6 weeks ago. They were last seen 3/8/2023.     Since their last visit reports 85-90% improvement. He still reports significant pain with quick extension of left elbow. They indicate that their current pain level is 0/10. They have tried rest/activity avoidance and previous imaging (xray 3/8/23).      The patient is seen by themselves.    Patient's past medical, surgical, social, and family histories were reviewed today and no changes are noted.    REVIEW OF  SYSTEMS:  Constitutional: NEGATIVE for fever, chills, change in weight  Skin: NEGATIVE for worrisome rashes, moles or lesions  GI/: NEGATIVE for bowel or bladder changes  Neuro: NEGATIVE for weakness, dizziness or paresthesias    OBJECTIVE:  Wt 78 kg (172 lb)   BMI 26.15 kg/m     General: healthy, alert and in no distress  HEENT: no scleral icterus or conjunctival erythema  Skin: no suspicious lesions or rash. No jaundice.  CV: regular rhythm by palpation, no pedal edema  Resp: normal respiratory effort without conversational dyspnea   Psych: normal mood and affect  Gait: normal steady gait with appropriate coordination and balance  Neuro: normal light touch sensory exam of the extremities.    MSK:  LEFT ELBOW  Inspection:    No swelling, bruising, discoloration, or obvious deformity or asymmetry  Palpation:    Bony, ligamentous and tendinous landmarks are nontender.    Crepitus is Absent  Range of Motion:     Extension limited by 10 degrees due to stiffness / flexion near-full / pronation full / supination limited by stiffness  Strength:    No deficits in elbow flexion, extension, pronation, or supination.    Independent visualization of the below image:  Recent Results (from the past 24 hour(s))   XR Elbow Left G/E 3 Views        XR ELBOW LEFT G/E 3 VIEWS   4/5/2023 2:02 PM     HISTORY: Closed nondisplaced fracture of neck of left radius, initial  encounter  COMPARISON: 3/8/2023         IMPRESSION: Again seen is a subtle fracture of the left radial neck in  unchanged alignment. There is some sclerosis along the fracture margin  suggesting interval incomplete healing. The elbow effusion has  decreased in size.    MD Angélica HOUSTON MD, [facility]              Again, thank you for allowing me to participate in the care of your patient.        Sincerely,        Angélica Archibald MD

## 2023-04-05 NOTE — PROGRESS NOTES
ASSESSMENT & PLAN    1. Closed nondisplaced fracture of neck of left radius with routine healing, subsequent encounter      Tyrone Whitfield is a 27 year old right-handed male presenting for follow up of acute left elbow pain after a fall approx 6 weeks ago. History, examination and imaging findings were reviewed today, consistent with non-displaced transverse fracture of the left radial neck. Repeat X-rays today show excellent interval fracture healing.     Detailed plan:  - Discussed home exercises to work on elbow range of motion. May gradually increase weightbearing on left arm as tolerated based on pain.  - Activity modifications: Avoid activities that provoke pain. Gradually increase activity as symptoms improve.   - Heat or ice as needed.  - Discussed typical fracture healing course.   - Return/ED precautions discussed.    - Follow up as needed if pain returns or new injury occurs.     You may call our direct clinic number ([phone]) at any time with questions or concerns.    Angélica Archibald MD, [facility]opedic [facility]    -----    SUBJECTIVE:  Tyrone Whitfield is a 27 year old male who is seen in follow-up for left elbow pain due to injury ~ 6 weeks ago. They were last seen 3/8/2023.     Since their last visit reports 85-90% improvement. He still reports significant pain with quick extension of left elbow. They indicate that their current pain level is 0/10. They have tried rest/activity avoidance and previous imaging (xray 3/8/23).      The patient is seen by themselves.    Patient's past medical, surgical, social, and family histories were reviewed today and no changes are noted.    REVIEW OF SYSTEMS:  Constitutional: NEGATIVE for fever, chills, change in weight  Skin: NEGATIVE for worrisome rashes, moles or lesions  GI/: NEGATIVE for bowel or bladder changes  Neuro: NEGATIVE for weakness, dizziness or paresthesias    OBJECTIVE:  Wt 78 kg (172 lb)   BMI 26.15 kg/m      General: healthy, alert and in no distress  HEENT: no scleral icterus or conjunctival erythema  Skin: no suspicious lesions or rash. No jaundice.  CV: regular rhythm by palpation, no pedal edema  Resp: normal respiratory effort without conversational dyspnea   Psych: normal mood and affect  Gait: normal steady gait with appropriate coordination and balance  Neuro: normal light touch sensory exam of the extremities.    MSK:  LEFT ELBOW  Inspection:    No swelling, bruising, discoloration, or obvious deformity or asymmetry  Palpation:    Bony, ligamentous and tendinous landmarks are nontender.    Crepitus is Absent  Range of Motion:     Extension limited by 10 degrees due to stiffness / flexion near-full / pronation full / supination limited by stiffness  Strength:    No deficits in elbow flexion, extension, pronation, or supination.    Independent visualization of the below image:  Recent Results (from the past 24 hour(s))   XR Elbow Left G/E 3 Views        XR ELBOW LEFT G/E 3 VIEWS   4/5/2023 2:02 PM     HISTORY: Closed nondisplaced fracture of neck of left radius, initial  encounter  COMPARISON: 3/8/2023         IMPRESSION: Again seen is a subtle fracture of the left radial neck in  unchanged alignment. There is some sclerosis along the fracture margin  suggesting interval incomplete healing. The elbow effusion has  decreased in size.    MD Angélica HOUSTON MD, [facility] Orthopedic [facility]

## 2024-05-02 ENCOUNTER — MEDICAL CORRESPONDENCE (OUTPATIENT)
Dept: HEALTH INFORMATION MANAGEMENT | Facility: CLINIC | Age: 29
End: 2024-05-02
Payer: COMMERCIAL

## 2024-05-10 ENCOUNTER — TRANSCRIBE ORDERS (OUTPATIENT)
Dept: OTHER | Age: 29
End: 2024-05-10

## 2024-05-10 DIAGNOSIS — F33.1 DEPRESSION, MAJOR, RECURRENT, MODERATE (H): ICD-10-CM

## 2024-05-10 DIAGNOSIS — G89.29 CHRONIC PAIN: Primary | ICD-10-CM

## 2024-05-19 ASSESSMENT — ANXIETY QUESTIONNAIRES
IF YOU CHECKED OFF ANY PROBLEMS ON THIS QUESTIONNAIRE, HOW DIFFICULT HAVE THESE PROBLEMS MADE IT FOR YOU TO DO YOUR WORK, TAKE CARE OF THINGS AT HOME, OR GET ALONG WITH OTHER PEOPLE: NOT DIFFICULT AT ALL
4. TROUBLE RELAXING: MORE THAN HALF THE DAYS
5. BEING SO RESTLESS THAT IT IS HARD TO SIT STILL: MORE THAN HALF THE DAYS
7. FEELING AFRAID AS IF SOMETHING AWFUL MIGHT HAPPEN: NOT AT ALL
2. NOT BEING ABLE TO STOP OR CONTROL WORRYING: SEVERAL DAYS
GAD7 TOTAL SCORE: 8
3. WORRYING TOO MUCH ABOUT DIFFERENT THINGS: SEVERAL DAYS
1. FEELING NERVOUS, ANXIOUS, OR ON EDGE: SEVERAL DAYS
6. BECOMING EASILY ANNOYED OR IRRITABLE: SEVERAL DAYS

## 2024-05-19 ASSESSMENT — PAIN SCALES - PAIN ENJOYMENT GENERAL ACTIVITY SCALE (PEG)
AVG_PAIN_PASTWEEK: 7
INTERFERED_ENJOYMENT_LIFE: 7
PEG_TOTALSCORE: 7
INTERFERED_GENERAL_ACTIVITY: 7

## 2024-05-21 ASSESSMENT — PAIN SCALES - PAIN ENJOYMENT GENERAL ACTIVITY SCALE (PEG)
PEG_TOTALSCORE: 7
INTERFERED_GENERAL_ACTIVITY: 7
AVG_PAIN_PASTWEEK: 7
INTERFERED_ENJOYMENT_LIFE: 7

## 2024-05-21 ASSESSMENT — ANXIETY QUESTIONNAIRES
8. IF YOU CHECKED OFF ANY PROBLEMS, HOW DIFFICULT HAVE THESE MADE IT FOR YOU TO DO YOUR WORK, TAKE CARE OF THINGS AT HOME, OR GET ALONG WITH OTHER PEOPLE?: NOT DIFFICULT AT ALL
7. FEELING AFRAID AS IF SOMETHING AWFUL MIGHT HAPPEN: NOT AT ALL
GAD7 TOTAL SCORE: 8
GAD7 TOTAL SCORE: 8

## 2024-05-23 ENCOUNTER — OFFICE VISIT (OUTPATIENT)
Dept: PALLIATIVE MEDICINE | Facility: CLINIC | Age: 29
End: 2024-05-23
Attending: STUDENT IN AN ORGANIZED HEALTH CARE EDUCATION/TRAINING PROGRAM
Payer: COMMERCIAL

## 2024-05-23 VITALS
WEIGHT: 175.1 LBS | OXYGEN SATURATION: 99 % | BODY MASS INDEX: 26.62 KG/M2 | HEART RATE: 67 BPM | SYSTOLIC BLOOD PRESSURE: 120 MMHG | DIASTOLIC BLOOD PRESSURE: 84 MMHG

## 2024-05-23 DIAGNOSIS — G89.29 OTHER CHRONIC PAIN: ICD-10-CM

## 2024-05-23 DIAGNOSIS — M79.10 MYALGIA: ICD-10-CM

## 2024-05-23 DIAGNOSIS — F33.1 DEPRESSION, MAJOR, RECURRENT, MODERATE (H): ICD-10-CM

## 2024-05-23 DIAGNOSIS — R20.2 PARESTHESIA: Primary | ICD-10-CM

## 2024-05-23 PROCEDURE — 99214 OFFICE O/P EST MOD 30 MIN: CPT | Performed by: NURSE PRACTITIONER

## 2024-05-23 RX ORDER — METHOCARBAMOL 500 MG/1
500 TABLET, FILM COATED ORAL 4 TIMES DAILY PRN
Qty: 120 TABLET | Refills: 2 | Status: SHIPPED | OUTPATIENT
Start: 2024-05-23

## 2024-05-23 RX ORDER — DEXTROAMPHETAMINE SACCHARATE, AMPHETAMINE ASPARTATE MONOHYDRATE, DEXTROAMPHETAMINE SULFATE AND AMPHETAMINE SULFATE 1.25; 1.25; 1.25; 1.25 MG/1; MG/1; MG/1; MG/1
5 CAPSULE, EXTENDED RELEASE ORAL EVERY MORNING
COMMUNITY
Start: 2024-05-02

## 2024-05-23 ASSESSMENT — PAIN SCALES - GENERAL: PAINLEVEL: SEVERE PAIN (6)

## 2024-05-23 NOTE — PATIENT INSTRUCTIONS
Pain Clinic telephone number: [phone], After Hours Emergencies:  [phone]  Messages are returned Monday - Friday between 8 AM and 4:00 PM, typically within 48-72 hours.  Call the clinic or send a [employer] message with the medication name and your pharmacy 7 days in advance as it may take 3-4 days to process.  We do not refill medications outside of normal clinic hours.   If you are unable to keep your appointment please call at least 24 hours in advance to allow us to offer the appointment time to another patient. Multiple missed appointments will lead to dismissal from the clinic.

## 2024-05-23 NOTE — LETTER
5/23/2024        RE: Tyrone Whitfield  [address] [facility] [address]        [facility]     Date of visit: 5/23/2024    Consultation: Tyrone Whitfield is a 28 year old male who has a past medical history of MDD (major depressive disorder) and Tourette's disorder. Tyrone is being seen today at the request of Maria Victoria Martinez for evaluation of his pain issues and recommendations for management.     Referral placed: 5/10/2024  Referred By    Maria Victoria Martinez PA-C [facility]   [address] [place] [facility] [address]   Phone: [phone]   Fax: [phone]    Diagnoses: Chronic pain   Depression, major, recurrent, moderate (H)   Order: Pain Management  Referral        Relevant records/History:  I have reviewed available medical information in the patient's electronic medical record including relevant provider notes, laboratory work, and imaging.   ____________________________________________________________  History of Present Illness  -Tyrone reports  that he has had persistent upper and lower back pain since about age 19. At that time, he had been working out regularly and after a few typical work outs, he had an onset of significant, widespread pain that took several months to recover from.  Since that time, his pain tends to come and go, but typically includes burning and numbness in the upper back and into the left upper arm, and his lower back.  - He has tried many treatment options and has had many tests and diagnostic imaging. There have been no identifiable causes to treat his symptoms.  He has had a poor response to medication, tends to either make him feel altered or has no relief. He has found robaxin to be beneficial.   - He has found that lifestyle modification has provided him the most benefit. If he does not eat any grains or dairy, his symptoms remain manageable. His diet is mostly plant based/ high protein. He has also found  "creatine supplement to be helpful. He can tolerate rice, but only every other day.  - GERALDO wave machine (he describes this as tens like) has helped him to identify deficiency in muscle strength and then working on that area by hyper-stimulating it. Following these treatments, he has had less pain. These treatments are expensive ($125 per session), so this limits his ability to use frequently.   - He had 2 days of shooting type pain down his inner thighs to the bottom of his heels recently, but this has now resolved. He had not had this pain ever before.    Pain description:  Location:     Quality: burning, sharp, numbness, dull, aching, throbbing   Duration: Constant   Severity/Intensity (0 = No pain to 10 = Worst pain imaginable)   Now: 7, Average: 6, Best: 5, Worst: 7  Aggravating factors include: standing, sitting, exercise, eating certain foods  Relieving factors include: lying down, walking, exercise, avoiding certain foods.    Pain Intensity and Interference in the past week  Average Pain 7  Pain interference with your ENJOYMENT OF LIFE? 7  Pain interference with your GENERAL ACTIVITY? 7  PEG Total Score: 7    Current pain medications:  none    PAIN MANAGEMENT TREATMENT HISTORY  1. MEDICATIONS:  Opioids: None  NSAIDs: Ibuprofen, naproxen  Muscle relaxants: Methocarbamol -helpful, flexeril  Pain Antidepressants/ Anxiolytics: Bupropion -helpful for mood  Neuroleptics: gabapentin - had horrible dreams, felt more sedated, tried 3-4   Topical: \"Copper Fixx\" helps a little bit (contains copper, arnica and hotact) Lidocaine patch -not helpful  Adjuvant medications: Acetaminophen -not helpful and CBD oil- helped to relax, but not pain, medrol dose pack -not helpful  2. PHYSICAL THERAPY:   2021: PT for shoulder as well as blood flow restriction therapy for the leg   3. PAIN PSYCHOLOGY:   Has not tried  4. SURGERY:   No pain related surgeries  5. INJECTIONS:   Has not tried  6. COMPLEMENTARY THERAPY:  Chiropractic: " Tried, not helpful  Acupuncture/acupressure: Tried, helpful?   TENS unit: Tried, somewhat helpful  heat/cold applications: Tried, cold is somewhat helpful  Massage is somewhat helpful  7. PREVIOUS PAIN CLINIC:  Tyrone has been seen at this pain clinic in the past. 2/17/21 virtual evaluation with Dr. Allred, 10/19/21 visit with Lucy Steiner NP      Past Medical History  He has a past medical history of MDD (major depressive disorder) and Tourette's disorder.   Past Surgical History  He has a past surgical history that includes tonsillectomy & adenoidectomy.   Social History  He reports that he has never smoked. He has never used smokeless tobacco. He reports current alcohol use. He reports that he does not use drugs.  Social History     Social History Narrative    In a relationship.    No kids.    Regular exercise.       Medications and Allergies reviewed.  Medications   has a current medication list which includes the following prescription(s): amphetamine-dextroamphetamine, bupropion, bupropion, and multiple vitamins-minerals.  Allergies  Patient has no known allergies.  Objective  /84   Pulse 67   Wt 79.4 kg (175 lb 1.6 oz)   SpO2 99%   BMI 26.62 kg/m    Constitutional: Well developed, well nourished, appears stated age. No acute distress.  Gait is normal and steady  HEENT: Head atraumatic, normocephalic. Eyes without conjunctival injection or jaundice. Neck supple.   Skin: No obvious rash, lesions, or petechiae of exposed skin.   Extremities: Peripheral pulses intact. No clubbing, cyanosis, or edema. Moves all extremities.  Psychiatric/mental status: Alert, without lethargy or stupor. Speech fluent. Appropriate affect. Mood normal. Able to follow commands without difficulty.   Musculoskeletal exam: Normal bulk and tone. Unremarkable spinal curvature.     Significant Results and Procedures   Imaging:  MRI OF THE CERVICAL SPINE WITHOUT CONTRAST 1/27/2021  FINDINGS: Alignment, vertebral body heights and  disc heights are  normal. Marrow signal and spinal cord signal are normal. The facets  articulate normally. The paraspinous soft tissues are unremarkable.  The cervicomedullary junction is patent. The spinal canal and neural  foramina are widely patent throughout the cervical spine.    EMG 4/8/2021: RLE, normal    MR SHOULDER ARTHROGRAM LEFT WITH CONTRAST 12/9/2021  IMPRESSION: Negative left shoulder MRI. Nothing to explain the patient's symptoms. No labral tear or rotator cuff tear.    Labs:  Creatinine   Date Value Ref Range Status   03/15/2020 1.18 0.66 - 1.25 mg/dL Final     AST   Date Value Ref Range Status   06/15/2015 29 0 - 35 U/L Final     ALT   Date Value Ref Range Status   06/15/2015 24 0 - 50 U/L Final         Assessment & Plan  Chronic pain  Paresthesia  Myalgia  He has had significant pain for almost 10 years, however has not had an identifiable source or cause. He has managed well with lifestyle modifications and his response to foods (pain, bloating) could be indicative of autoimmune type response. However, no significant lab findings supported this. Given paresthesias, it would be appropriate to consult neurology to see if they have additional insight into his persistent symptoms. As robaxin has been beneficial, this was refilled. Advised that his symptoms are real and relevant, however sometimes we do not have full understanding of why they occur.   - Adult Neurology  Referral  - methocarbamol (ROBAXIN) 500 MG tablet  Dispense: 120 tablet; Refill: 2    Depression, major, recurrent, moderate (H)  Stable. Established chronic issue with no current exacerbations or new concerns.     He is welcome to follow-up as needed.     Abby Bill, CNP-BC, PMGT-BC, AP-PMN  [facility]

## 2024-05-23 NOTE — PROGRESS NOTES
[facility] Pain Management     Date of visit: 5/23/2024    Consultation: Tyrone Whitfield is a 28 year old male who has a past medical history of MDD (major depressive disorder) and Tourette's disorder. Tyrone is being seen today at the request of Maria Victoria Martinez for evaluation of his pain issues and recommendations for management.     Referral placed: 5/10/2024  Referred By    Maria Victoria Martinez PA-C [facility]   [address] [place] [facility] [address]   Phone: [phone]   Fax: [phone]    Diagnoses: Chronic pain   Depression, major, recurrent, moderate (H)   Order: Pain Management  Referral        Relevant records/History:  I have reviewed available medical information in the patient's electronic medical record including relevant provider notes, laboratory work, and imaging.   ____________________________________________________________  History of Present Illness   -Tyrone reports  that he has had persistent upper and lower back pain since about age 19. At that time, he had been working out regularly and after a few typical work outs, he had an onset of significant, widespread pain that took several months to recover from.  Since that time, his pain tends to come and go, but typically includes burning and numbness in the upper back and into the left upper arm, and his lower back.  - He has tried many treatment options and has had many tests and diagnostic imaging. There have been no identifiable causes to treat his symptoms.  He has had a poor response to medication, tends to either make him feel altered or has no relief. He has found robaxin to be beneficial.   - He has found that lifestyle modification has provided him the most benefit. If he does not eat any grains or dairy, his symptoms remain manageable. His diet is mostly plant based/ high protein. He has also found creatine supplement to be helpful. He can tolerate rice, but only every other day.  - GERALDO wave  "machine (he describes this as tens like) has helped him to identify deficiency in muscle strength and then working on that area by hyper-stimulating it. Following these treatments, he has had less pain. These treatments are expensive ($125 per session), so this limits his ability to use frequently.   - He had 2 days of shooting type pain down his inner thighs to the bottom of his heels recently, but this has now resolved. He had not had this pain ever before.    Pain description:  Location:     Quality: burning, sharp, numbness, dull, aching, throbbing   Duration: Constant   Severity/Intensity (0 = No pain to 10 = Worst pain imaginable)   Now: 7, Average: 6, Best: 5, Worst: 7  Aggravating factors include: standing, sitting, exercise, eating certain foods  Relieving factors include: lying down, walking, exercise, avoiding certain foods.    Pain Intensity and Interference in the past week  Average Pain 7  Pain interference with your ENJOYMENT OF LIFE? 7  Pain interference with your GENERAL ACTIVITY? 7  PEG Total Score: 7    Current pain medications:  none    PAIN MANAGEMENT TREATMENT HISTORY  1. MEDICATIONS:  Opioids: None  NSAIDs: Ibuprofen, naproxen  Muscle relaxants: Methocarbamol -helpful, flexeril  Pain Antidepressants/ Anxiolytics: Bupropion -helpful for mood  Neuroleptics: gabapentin - had horrible dreams, felt more sedated, tried 3-4   Topical: \"Copper Fixx\" helps a little bit (contains copper, arnica and hotact) Lidocaine patch -not helpful  Adjuvant medications: Acetaminophen -not helpful and CBD oil- helped to relax, but not pain, medrol dose pack -not helpful  2. PHYSICAL THERAPY:   2021: PT for shoulder as well as blood flow restriction therapy for the leg   3. PAIN PSYCHOLOGY:   Has not tried  4. SURGERY:   No pain related surgeries  5. INJECTIONS:   Has not tried  6. COMPLEMENTARY THERAPY:  Chiropractic: Tried, not helpful  Acupuncture/acupressure: Tried, helpful?   TENS unit: Tried, somewhat " helpful  heat/cold applications: Tried, cold is somewhat helpful  Massage is somewhat helpful  7. PREVIOUS PAIN CLINIC:  Tyrone has been seen at this pain clinic in the past. 2/17/21 virtual evaluation with Dr. Allred, 10/19/21 visit with Lucy Steiner NP      Past Medical History   He has a past medical history of MDD (major depressive disorder) and Tourette's disorder.   Past Surgical History   He has a past surgical history that includes tonsillectomy & adenoidectomy.   Social History   He reports that he has never smoked. He has never used smokeless tobacco. He reports current alcohol use. He reports that he does not use drugs.  Social History     Social History Narrative    In a relationship.    No kids.    Regular exercise.       Medications and Allergies reviewed.  Medications    has a current medication list which includes the following prescription(s): amphetamine-dextroamphetamine, bupropion, bupropion, and multiple vitamins-minerals.  Allergies   Patient has no known allergies.  Objective   /84   Pulse 67   Wt 79.4 kg (175 lb 1.6 oz)   SpO2 99%   BMI 26.62 kg/m    Constitutional: Well developed, well nourished, appears stated age. No acute distress.  Gait is normal and steady  HEENT: Head atraumatic, normocephalic. Eyes without conjunctival injection or jaundice. Neck supple.   Skin: No obvious rash, lesions, or petechiae of exposed skin.   Extremities: Peripheral pulses intact. No clubbing, cyanosis, or edema. Moves all extremities.  Psychiatric/mental status: Alert, without lethargy or stupor. Speech fluent. Appropriate affect. Mood normal. Able to follow commands without difficulty.   Musculoskeletal exam: Normal bulk and tone. Unremarkable spinal curvature.     Significant Results and Procedures    Imaging:  MRI OF THE CERVICAL SPINE WITHOUT CONTRAST 1/27/2021  FINDINGS: Alignment, vertebral body heights and disc heights are  normal. Marrow signal and spinal cord signal are normal. The  facets  articulate normally. The paraspinous soft tissues are unremarkable.  The cervicomedullary junction is patent. The spinal canal and neural  foramina are widely patent throughout the cervical spine.    EMG 4/8/2021: RLE, normal    MR SHOULDER ARTHROGRAM LEFT WITH CONTRAST 12/9/2021  IMPRESSION: Negative left shoulder MRI. Nothing to explain the patient's symptoms. No labral tear or rotator cuff tear.    Labs:  Creatinine   Date Value Ref Range Status   03/15/2020 1.18 0.66 - 1.25 mg/dL Final     AST   Date Value Ref Range Status   06/15/2015 29 0 - 35 U/L Final     ALT   Date Value Ref Range Status   06/15/2015 24 0 - 50 U/L Final         Assessment & Plan   Chronic pain  Paresthesia  Myalgia  He has had significant pain for almost 10 years, however has not had an identifiable source or cause. He has managed well with lifestyle modifications and his response to foods (pain, bloating) could be indicative of autoimmune type response. However, no significant lab findings supported this. Given paresthesias, it would be appropriate to consult neurology to see if they have additional insight into his persistent symptoms. As robaxin has been beneficial, this was refilled. Advised that his symptoms are real and relevant, however sometimes we do not have full understanding of why they occur.   - Adult Neurology  Referral  - methocarbamol (ROBAXIN) 500 MG tablet  Dispense: 120 tablet; Refill: 2    Depression, major, recurrent, moderate (H)  Stable. Established chronic issue with no current exacerbations or new concerns.     He is welcome to follow-up as needed.     Abby Bill, CNP-BC, PMGT-BC, AP-PMN  [facility]

## 2024-06-02 ENCOUNTER — HEALTH MAINTENANCE LETTER (OUTPATIENT)
Age: 29
End: 2024-06-02

## 2024-08-20 ENCOUNTER — OFFICE VISIT (OUTPATIENT)
Dept: ORTHOPEDICS | Facility: CLINIC | Age: 29
End: 2024-08-20
Payer: COMMERCIAL

## 2024-08-20 VITALS — BODY MASS INDEX: 26.52 KG/M2 | HEIGHT: 68 IN | WEIGHT: 175 LBS

## 2024-08-20 DIAGNOSIS — M25.561 ACUTE PAIN OF RIGHT KNEE: ICD-10-CM

## 2024-08-20 PROCEDURE — 99202 OFFICE O/P NEW SF 15 MIN: CPT | Performed by: PHYSICIAN ASSISTANT

## 2024-08-20 NOTE — PROGRESS NOTES
"ASSESSMENT & PLAN  Tyrone Whitfield seen today for his right knee pain.  We discussed that his exam today is reassuring for no ligament injury or meniscus involvement.  His symptoms seem more consistent with a sprain of the knee joint.  We did review management including over-the-counter ibuprofen, Voltaren gel, icing, compression knee brace as well as avoiding high impact activities and resting his knee for couple weeks.  We talked about the possibility of draining his knee given the amount of fluid on his knee and at this time we will defer.  Should his symptoms worsen or not improve advised him to follow-up with sports medicine.  He can certainly reach out if he would like to consider physical therapy and I will place a referral for this.  He was in agreement with our plan.  All questions answered.    Dariana Yi PA-C  [facility]    -----  Chief Complaint   Patient presents with    Right Knee - Pain       SUBJECTIVE  Tyrone Whitfield is a/an 28 year old male who is seen as a WALK IN patient for evaluation of right knee .  Onset was 3 weeks although states 2 days ago was playing soccer and his knee filled up with fluid after words . Pain is located on the lateral aspect of  knee . Symptoms are worsened by palpation, and some pain with flexion .  He has tried rest, ice tylenol . Associated symptoms include sharp dull and achy swelling.    The patient is seen alone    Prior injury/Surgical history of affected joint: History of knee sprain from high school   Social History/Occupation:      REVIEW OF SYSTEMS:  Pertinent positives/negative: As stated above in HPI    OBJECTIVE:  Ht 1.727 m (5' 8\")   Wt 79.4 kg (175 lb)   BMI 26.61 kg/m     General: Alert and in no distress  Skin: no visable rashes  CV: Extremities appear well perfused   Resp: normal respiratory effort, no conversational dyspnea   Psych: normal mood, affect  MSK: RIGHT KNEE    Inspection:  There is no " evidence of open wounds.   There is no evidence of erythema or infection  There is moderate swelling    Palpation:  There is no palpable fluctuance  There is tenderness to palpation at lateral border of patella  There is no tenderness to palpation at medial, lateral joint line, quad and hamstrings.    Strength:  Knee flexion: 5/5  Knee extension: 5/5  Extensor mechanism intact    Sensory:  Intact to light touch in the lower extremity bilaterally     Dorsalis pedis pulse is palpable and equal to contralateral side    Range of Motion:  Flexion: 100 degrees  Extension: 0 degrees    Examination Maneuvers:  Margarita's: -  Anterior drawer: -  Posterior drawer: -    Stable to varus and valgus stress at 0 and 30 degrees of flexion        RADIOLOGY:  No imaging.

## 2024-08-20 NOTE — PATIENT INSTRUCTIONS
1. Acute pain of right knee        -Patient is seen for his right knee pain.  His exam is reassuring for no ligament or meniscus injury.  At this time I would treat this like a sprain and recommend that he utilize ibuprofen, icing, compression bracing as well as avoiding high impact activity while this continues to improve.  We did discuss when returning to activities with high impact it would be reasonable to use a compression knee sleeve for support and stability.  -We discussed that if symptoms do not improve would like to consider physical therapy to reach out to our office and we will place a referral for this.  If his symptoms are not improving over the next 6 to 8 weeks and have him follow-up with sports medicine for repeat evaluation.    -Call direct clinic number [[phone]] at any time with questions or concerns.    -Orthopedic Walk in Monday through Thursday 8 am to 6 pm, Friday 8 am to 5 pm, Saturday 8 am to 12 pm at [address] [address].

## 2024-10-24 ENCOUNTER — OFFICE VISIT (OUTPATIENT)
Dept: NEUROLOGY | Facility: CLINIC | Age: 29
End: 2024-10-24
Attending: NURSE PRACTITIONER
Payer: COMMERCIAL

## 2024-10-24 VITALS
BODY MASS INDEX: 26.3 KG/M2 | HEIGHT: 68 IN | WEIGHT: 173.5 LBS | DIASTOLIC BLOOD PRESSURE: 78 MMHG | SYSTOLIC BLOOD PRESSURE: 117 MMHG | OXYGEN SATURATION: 99 % | HEART RATE: 84 BPM

## 2024-10-24 DIAGNOSIS — M79.10 MYALGIA: ICD-10-CM

## 2024-10-24 DIAGNOSIS — R20.2 PARESTHESIA: ICD-10-CM

## 2024-10-24 PROCEDURE — 99203 OFFICE O/P NEW LOW 30 MIN: CPT | Mod: GC

## 2024-10-24 NOTE — PROGRESS NOTES
[facility]  Section of General Neurology  New Patient Visit      Tyrone Whitfield MRN# [id number]   Age: 28 year old YOB: 1995     Requesting physician: Bel Dennis     Reason for Consultation: Shoulder/back pain           Assessment and Plan:   Assessment:    Intermittent pain of the bilateral shoulders and proximal arms    Patient is a 28-year-old male with a past medical history of tic disorder and major depressive disorder presenting from pain clinic for evaluation of possible underlying neurologic condition associated with pain of the bilateral shoulders and proximal arms.    Neurological exam today is unremarkable.  No symptoms today.  No pain during strength exam.  No pain to the muscles on palpation.  No weakness, numbness or tingling.  No symptoms with positional changes including arm raising.  Thorough lab workup over the last several years has been negative for any inflammatory or metabolic process.  An EMG of the right lower extremity performed a few years ago was negative.  Overall suspicion for an underlying neurologic disorder to explain this patient's symptoms is highly unlikely.  Discussed option for further workup with left upper extremity EMG and myopathy/myositis labs, however did mention that these tests would likely be low yield based on his history and current examination and thus via shared decision making, these tests were deferred at this time.  Patient to call with any further questions or concerns or if he changes his mind about performing these tests.       Plan:  -No further neurological workup needed at this time  -No formal follow-up needed, return on an as-needed basis.          RAYMUNDO Saucedo D.O.  Resident Physician of Neurology  [facility]    Patient discussed with my supervising physician Dr. Carrington, who agrees with the critical findings, assessment, and plan as documented in the note  above or as otherwise in their attestation.        History of Presenting Symptoms:   Tyrone Whitfield is a 28 year old male who presents today for evaluation of multifocal pain that is described as burning, throbbing and aching.  This pain is intermittent and began approximately 10 years ago.  He has had extensive workup in the past for inflammatory/infectious and metabolic etiologies, however workup has been negative.  Pain seems to be localized at the SI joint of the hips as well as shoulders, biceps and rhomboids bilaterally.  He says that the pain is worse on his left side.  Pain comes and goes.  He states that over the last several years he has been able to pinpoint certain triggers including certain foods and exercises.  Any time he eats something like grains, daily and legumes he has a flare.  Pain flares can last up to 2 weeks.  Today he does not have any symptoms.  He does not have any pain that is provoked with movements or during the neurological examination.  His pain has not increased in frequency or changed much in the last 10 years.  He is frustrated because he has not been able to find an answer as to what the etiology of his pain is.  He states if anything, he has been getting better with avoiding certain foods, recent Flexeril prescription and changing his workout routine.  He is improving.    He states that he has to regulate how much he exercises he stays.  If he exercises last intensely he tends to have less symptoms.  He states that creatine supplementation helps. He takes some basics supplements such citrulline and arginine and beta alanine.      No history of severe trauma.    It has been better recently.  He states that he cannot wear his certain pairs of shoes longer than 3 months at a time or else he develops low back and leg pain.  He tends to prefer shoes that provide adequate support.    Notably, he has a history of tic disorder (noted to be Tourette's syndrome in his chart however he  does not endorse verbal tics at this time).  Head and shoulder tics noted throughout the visit.    Initial work up including ESR/CRP were normal. CK recently was WNL.      Cervical spine MRI was negative for significant stenosis or evidence of a radiculopathy.          Past Medical History:     Patient Active Problem List   Diagnosis    Tourette's disorder    MDD (major depressive disorder)     Past Medical History:   Diagnosis Date    MDD (major depressive disorder)     Tourette's disorder         Past Surgical History:     Past Surgical History:   Procedure Laterality Date    TONSILLECTOMY & ADENOIDECTOMY          Social History:     Social History     Tobacco Use    Smoking status: Never    Smokeless tobacco: Never   Substance Use Topics    Alcohol use: Yes    Drug use: Never        Family History:     Family History   Adopted: Yes   Problem Relation Age of Onset    Lung Cancer Maternal Grandmother         smoker    Leukemia Paternal Grandfather     Hypertension Father     Cholesteatoma Father     Pacemaker Paternal Uncle     Psoriasis Mother     Diabetes No family hx of     Myocardial Infarction No family hx of     Cerebrovascular Disease No family hx of     Prostate Cancer No family hx of     Colon Cancer No family hx of         Medications:     Current Outpatient Medications   Medication Sig Dispense Refill    amphetamine-dextroamphetamine (ADDERALL XR) 5 MG 24 hr capsule Take 5 mg by mouth every morning      buPROPion (WELLBUTRIN SR) 100 MG 12 hr tablet       buPROPion (WELLBUTRIN SR) 150 MG 12 hr tablet       methocarbamol (ROBAXIN) 500 MG tablet Take 1 tablet (500 mg) by mouth 4 times daily as needed for muscle spasms 120 tablet 2    Multiple Vitamins-Minerals (MULTIVITAMIN & MINERAL PO) Take 1 tablet by mouth daily       No current facility-administered medications for this visit.        Allergies:   No Known Allergies     Review of Systems:   As noted above     Physical Exam:   Vitals: /78   Pulse  "84   Ht 1.727 m (5' 8\")   Wt 78.7 kg (173 lb 8 oz)   SpO2 99%   BMI 26.38 kg/m       General:   No apparent distress, well-nourished, well-groomed, pleasant     Neuro:   Mental Status: Alert and oriented to person, place, and time. Speech fluent and comprehension intact. No dysarthria. Normal memory, fund of knowledge, attention/concentration, and language  Cranial Nerves:   II: Visual fields: normal  III: Pupils: 3 mm, equal, round, reactive to light   III,IV,VI: Extraocular Movements: intact   V: Facial sensation: intact to light touch  VII: Facial strength: intact without asymmetry  VIII: Hearing: intact grossly  IX: Palate: intact   XI: Shoulder shrug: intact  XII: Tongue movement: normal  Motor Exam:   Upper Extremities  Deltoid  Bicep  Tricep  Wrist Extensors  strength Intrinsic Muscles    Right  5  5  5  5 5 5    Left  5  5  5  5 5 5      Lower Extremities  Hip Flexors  Knee Extensors  Knee   Flexors  Dorsi Flexion  Plantar   Flexion    Right  5  5  5  5  5    Left  5  5  5  5  5    No drift is present. No abnormal movements. Tone is normal throughout.  Sensory: intact to light touch, vibration, and pinprick throughout   Coordination: no dysmetria with finger-to-nose bilaterally  Reflexes: biceps, triceps, brachioradialis, patellar, and ankle jerks 2+ and symmetric. Toes are downgoing bilaterally  Gait: normal casual gait, normal stride length         Data: Pertinent prior to visit   Imaging:  Cervical spine MRI on 1/27/2021  IMPRESSION: Normal cervical spine MRI.     Procedures:  Reviewed    Laboratory:  Reviewed         "

## 2024-10-24 NOTE — NURSING NOTE
"Tyrone Whitfield is a 28 year old male who presents for:  Chief Complaint   Patient presents with    Numbness     Paresthesia, burning, throbbing, shoulder, hips         Initial Vitals:  /78   Pulse 84   Ht 1.727 m (5' 8\")   Wt 78.7 kg (173 lb 8 oz)   SpO2 99%   BMI 26.38 kg/m   Estimated body mass index is 26.38 kg/m  as calculated from the following:    Height as of this encounter: 1.727 m (5' 8\").    Weight as of this encounter: 78.7 kg (173 lb 8 oz).. Body surface area is 1.94 meters squared. BP completed using cuff size: regular    Ju Rathai   "

## 2024-10-24 NOTE — LETTER
10/24/2024      Tyrone Whitfield  [address] [address] [address]      Dear Colleague,    Thank you for referring your patient, Tyrone Whitfield, to the [facility] NEUROLOG[facility]. Please see a copy of my visit note below.    [facility]  Section of General Neurology  New Patient Visit      Tyrone Whitfield MRN# [id number]   Age: 28 year old YOB: 1995     Requesting physician: Bel Dennis     Reason for Consultation: Shoulder/back pain           Assessment and Plan:   Assessment:    Intermittent pain of the bilateral shoulders and proximal arms    Patient is a 28-year-old male with a past medical history of tic disorder and major depressive disorder presenting from pain clinic for evaluation of possible underlying neurologic condition associated with pain of the bilateral shoulders and proximal arms.    Neurological exam today is unremarkable.  No symptoms today.  No pain during strength exam.  No pain to the muscles on palpation.  No weakness, numbness or tingling.  No symptoms with positional changes including arm raising.  Thorough lab workup over the last several years has been negative for any inflammatory or metabolic process.  An EMG of the right lower extremity performed a few years ago was negative.  Overall suspicion for an underlying neurologic disorder to explain this patient's symptoms is highly unlikely.  Discussed option for further workup with left upper extremity EMG and myopathy/myositis labs, however did mention that these tests would likely be low yield based on his history and current examination and thus via shared decision making, these tests were deferred at this time.  Patient to call with any further questions or concerns or if he changes his mind about performing these tests.       Plan:  -No further neurological workup needed at this time  -No formal follow-up needed, return on an as-needed  mane Saucedo D.O.  Resident Physician of Neurology  [facility]    Patient discussed with my supervising physician Dr. Carrington, who agrees with the critical findings, assessment, and plan as documented in the note above or as otherwise in their attestation.        History of Presenting Symptoms:   Tyrone Whitfield is a 28 year old male who presents today for evaluation of multifocal pain that is described as burning, throbbing and aching.  This pain is intermittent and began approximately 10 years ago.  He has had extensive workup in the past for inflammatory/infectious and metabolic etiologies, however workup has been negative.  Pain seems to be localized at the SI joint of the hips as well as shoulders, biceps and rhomboids bilaterally.  He says that the pain is worse on his left side.  Pain comes and goes.  He states that over the last several years he has been able to pinpoint certain triggers including certain foods and exercises.  Any time he eats something like grains, daily and legumes he has a flare.  Pain flares can last up to 2 weeks.  Today he does not have any symptoms.  He does not have any pain that is provoked with movements or during the neurological examination.  His pain has not increased in frequency or changed much in the last 10 years.  He is frustrated because he has not been able to find an answer as to what the etiology of his pain is.  He states if anything, he has been getting better with avoiding certain foods, recent Flexeril prescription and changing his workout routine.  He is improving.    He states that he has to regulate how much he exercises he stays.  If he exercises last intensely he tends to have less symptoms.  He states that creatine supplementation helps. He takes some basics supplements such citrulline and arginine and beta alanine.      No history of severe trauma.    It has been better recently.  He states that he cannot wear his  certain pairs of shoes longer than 3 months at a time or else he develops low back and leg pain.  He tends to prefer shoes that provide adequate support.    Notably, he has a history of tic disorder (noted to be Tourette's syndrome in his chart however he does not endorse verbal tics at this time).  Head and shoulder tics noted throughout the visit.    Initial work up including ESR/CRP were normal. CK recently was WNL.      Cervical spine MRI was negative for significant stenosis or evidence of a radiculopathy.          Past Medical History:     Patient Active Problem List   Diagnosis     Tourette's disorder     MDD (major depressive disorder)     Past Medical History:   Diagnosis Date     MDD (major depressive disorder)      Tourette's disorder         Past Surgical History:     Past Surgical History:   Procedure Laterality Date     TONSILLECTOMY & ADENOIDECTOMY          Social History:     Social History     Tobacco Use     Smoking status: Never     Smokeless tobacco: Never   Substance Use Topics     Alcohol use: Yes     Drug use: Never        Family History:     Family History   Adopted: Yes   Problem Relation Age of Onset     Lung Cancer Maternal Grandmother         smoker     Leukemia Paternal Grandfather      Hypertension Father      Cholesteatoma Father      Pacemaker Paternal Uncle      Psoriasis Mother      Diabetes No family hx of      Myocardial Infarction No family hx of      Cerebrovascular Disease No family hx of      Prostate Cancer No family hx of      Colon Cancer No family hx of         Medications:     Current Outpatient Medications   Medication Sig Dispense Refill     amphetamine-dextroamphetamine (ADDERALL XR) 5 MG 24 hr capsule Take 5 mg by mouth every morning       buPROPion (WELLBUTRIN SR) 100 MG 12 hr tablet        buPROPion (WELLBUTRIN SR) 150 MG 12 hr tablet        methocarbamol (ROBAXIN) 500 MG tablet Take 1 tablet (500 mg) by mouth 4 times daily as needed for muscle spasms 120 tablet 2  "    Multiple Vitamins-Minerals (MULTIVITAMIN & MINERAL PO) Take 1 tablet by mouth daily       No current facility-administered medications for this visit.        Allergies:   No Known Allergies     Review of Systems:   As noted above     Physical Exam:   Vitals: /78   Pulse 84   Ht 1.727 m (5' 8\")   Wt 78.7 kg (173 lb 8 oz)   SpO2 99%   BMI 26.38 kg/m       General:   No apparent distress, well-nourished, well-groomed, pleasant     Neuro:   Mental Status: Alert and oriented to person, place, and time. Speech fluent and comprehension intact. No dysarthria. Normal memory, fund of knowledge, attention/concentration, and language  Cranial Nerves:   II: Visual fields: normal  III: Pupils: 3 mm, equal, round, reactive to light   III,IV,VI: Extraocular Movements: intact   V: Facial sensation: intact to light touch  VII: Facial strength: intact without asymmetry  VIII: Hearing: intact grossly  IX: Palate: intact   XI: Shoulder shrug: intact  XII: Tongue movement: normal  Motor Exam:   Upper Extremities  Deltoid  Bicep  Tricep  Wrist Extensors  strength Intrinsic Muscles    Right  5  5  5  5 5 5    Left  5  5  5  5 5 5      Lower Extremities  Hip Flexors  Knee Extensors  Knee   Flexors  Dorsi Flexion  Plantar   Flexion    Right  5  5  5  5  5    Left  5  5  5  5  5    No drift is present. No abnormal movements. Tone is normal throughout.  Sensory: intact to light touch, vibration, and pinprick throughout   Coordination: no dysmetria with finger-to-nose bilaterally  Reflexes: biceps, triceps, brachioradialis, patellar, and ankle jerks 2+ and symmetric. Toes are downgoing bilaterally  Gait: normal casual gait, normal stride length         Data: Pertinent prior to visit   Imaging:  Cervical spine MRI on 1/27/2021  IMPRESSION: Normal cervical spine MRI.     Procedures:  Reviewed    Laboratory:  Reviewed           Attestation signed by John Duarte MD at 10/25/2024 10:15 AM:  Attending Attestation    I saw and " evaluated the patient on 10/24/24 and agree with the findings and the plan of care as documented in the resident's note.       I personally reviewed vital signs, medications, labs and pertinent imaging.    We discussed that there is less likely a neurological reason for his diffuse pains but discussed possible next steps as below.  Given lower yield of testing and a bit of testing fatigue mutually elected to monitor which is fair.  He will reach out with issues questions or changes.      Jc Duarte MD   of Neurology  [facility]      Again, thank you for allowing me to participate in the care of your patient.        Sincerely,        Leonard Saucedo MD

## 2025-06-14 ENCOUNTER — HEALTH MAINTENANCE LETTER (OUTPATIENT)
Age: 30
End: 2025-06-14

## (undated) RX ORDER — EPINEPHRINE 1 MG/ML
INJECTION, SOLUTION, CONCENTRATE INTRAVENOUS
Status: DISPENSED
Start: 2021-12-09

## (undated) RX ORDER — LIDOCAINE HYDROCHLORIDE 10 MG/ML
INJECTION, SOLUTION EPIDURAL; INFILTRATION; INTRACAUDAL; PERINEURAL
Status: DISPENSED
Start: 2021-12-09